# Patient Record
Sex: FEMALE | URBAN - METROPOLITAN AREA
[De-identification: names, ages, dates, MRNs, and addresses within clinical notes are randomized per-mention and may not be internally consistent; named-entity substitution may affect disease eponyms.]

---
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
## 2024-02-06 DIAGNOSIS — E55.9 VITAMIN D DEFICIENCY: ICD-10-CM

## 2024-02-06 RX ORDER — CHOLECALCIFEROL (VITAMIN D3) 125 MCG
CAPSULE ORAL
Qty: 90 TABLET | Refills: 0 | Status: SHIPPED | OUTPATIENT
Start: 2024-02-06

## 2024-03-21 ENCOUNTER — TELEPHONE (OUTPATIENT)
Dept: FAMILY MEDICINE CLINIC | Facility: CLINIC | Age: 19
End: 2024-03-21

## 2024-05-07 DIAGNOSIS — E55.9 VITAMIN D DEFICIENCY: ICD-10-CM

## 2024-05-07 RX ORDER — ERGOCALCIFEROL 1.25 MG/1
50000 CAPSULE ORAL WEEKLY
Qty: 12 CAPSULE | Refills: 0 | OUTPATIENT
Start: 2024-05-07

## 2024-05-07 NOTE — TELEPHONE ENCOUNTER
Called and left message for patient to advise that prescription was denied and patient is to take 200 IU vitamin D a day and have lab done to check vitamin d level.     Order placed.

## 2024-05-09 DIAGNOSIS — E55.9 VITAMIN D DEFICIENCY: ICD-10-CM

## 2024-05-09 RX ORDER — CHOLECALCIFEROL (VITAMIN D3) 125 MCG
CAPSULE ORAL
Qty: 90 TABLET | Refills: 1 | Status: SHIPPED | OUTPATIENT
Start: 2024-05-09

## 2024-05-13 ENCOUNTER — OFFICE VISIT (OUTPATIENT)
Dept: FAMILY MEDICINE CLINIC | Facility: CLINIC | Age: 19
End: 2024-05-13
Payer: COMMERCIAL

## 2024-05-13 VITALS
TEMPERATURE: 97.1 F | HEART RATE: 92 BPM | OXYGEN SATURATION: 100 % | DIASTOLIC BLOOD PRESSURE: 70 MMHG | HEIGHT: 69 IN | WEIGHT: 162 LBS | SYSTOLIC BLOOD PRESSURE: 100 MMHG | BODY MASS INDEX: 23.99 KG/M2

## 2024-05-13 DIAGNOSIS — M25.562 CHRONIC PAIN OF BOTH KNEES: ICD-10-CM

## 2024-05-13 DIAGNOSIS — G89.29 CHRONIC RIGHT-SIDED LOW BACK PAIN WITH RIGHT-SIDED SCIATICA: ICD-10-CM

## 2024-05-13 DIAGNOSIS — M54.41 CHRONIC RIGHT-SIDED LOW BACK PAIN WITH RIGHT-SIDED SCIATICA: ICD-10-CM

## 2024-05-13 DIAGNOSIS — E55.9 VITAMIN D DEFICIENCY: ICD-10-CM

## 2024-05-13 DIAGNOSIS — G89.29 CHRONIC PAIN OF BOTH KNEES: ICD-10-CM

## 2024-05-13 DIAGNOSIS — M25.561 CHRONIC PAIN OF BOTH KNEES: ICD-10-CM

## 2024-05-13 DIAGNOSIS — Z00.01 ENCOUNTER FOR ROUTINE ADULT MEDICAL EXAM WITH ABNORMAL FINDINGS: Primary | ICD-10-CM

## 2024-05-13 PROCEDURE — 99395 PREV VISIT EST AGE 18-39: CPT | Performed by: FAMILY MEDICINE

## 2024-05-13 PROCEDURE — 99214 OFFICE O/P EST MOD 30 MIN: CPT | Performed by: FAMILY MEDICINE

## 2024-05-13 NOTE — PROGRESS NOTES
ADULT ANNUAL PHYSICAL  [facility]    NAME: Mechelle Parker  AGE: 19 y.o. SEX: female  : 2005     DATE: 2024     Assessment and Plan:     Problem List Items Addressed This Visit       Chronic right-sided low back pain with right-sided sciatica     Acute on chronic symptomatic no recent fall or trauma and no red flag symptoms recommend x-ray of the lumbar spine Tylenol for the pain         Relevant Orders    CBC and differential    Basic metabolic panel    Lipid panel    TSH, 3rd generation with Free T4 reflex    DANETTE Screen w/ Reflex to Titer/Pattern    C-reactive protein    Lyme Total AB W Reflex to IGM/IGG    RF Screen w/ Reflex to Titer    Vitamin D 25 hydroxy    Cyclic citrul peptide antibody, IgG    Uric acid    HLA-B27 antigen    XR spine lumbar minimum 4 views non injury    Vitamin D deficiency    Relevant Orders    CBC and differential    Basic metabolic panel    Lipid panel    TSH, 3rd generation with Free T4 reflex    DANETTE Screen w/ Reflex to Titer/Pattern    C-reactive protein    Lyme Total AB W Reflex to IGM/IGG    RF Screen w/ Reflex to Titer    Vitamin D 25 hydroxy    Cyclic citrul peptide antibody, IgG    Uric acid    HLA-B27 antigen    Encounter for routine adult medical exam with abnormal findings - Primary     Advice and education were given regarding nutrition, aerobic exercises, weight-bearing exercises, cardiovascular risk reduction, fall risk reduction, and age-appropriate supplements.     The patient was counseled regarding instructions for management, risk factor reductions, prognosis, risks and benefits of treatment options, patient and family education, and importance of compliance with treatment.         Chronic pain of both knees     New diagnosis symptomatic chronic more than 3 months no fall or trauma recommend Tylenol for the pain plan for x-ray of bilateral knee   Patient has a multiple joint pain  recommend to do workup blood work discussed with the patient and she agree         Relevant Orders    CBC and differential    Basic metabolic panel    Lipid panel    TSH, 3rd generation with Free T4 reflex    DANETTE Screen w/ Reflex to Titer/Pattern    C-reactive protein    Lyme Total AB W Reflex to IGM/IGG    RF Screen w/ Reflex to Titer    Vitamin D 25 hydroxy    Cyclic citrul peptide antibody, IgG    Uric acid    HLA-B27 antigen    XR knee 3 vw left non injury    XR knee 3 vw right non injury       Immunizations and preventive care screenings were discussed with patient today. Appropriate education was printed on patient's after visit summary.    Counseling:  Alcohol/drug use: discussed moderation in alcohol intake, the recommendations for healthy alcohol use, and avoidance of illicit drug use.  Dental Health: discussed importance of regular tooth brushing, flossing, and dental visits.  Injury prevention: discussed safety/seat belts, safety helmets, smoke detectors, carbon dioxide detectors, and smoking near bedding or upholstery.  Sexual health: discussed sexually transmitted diseases, partner selection, use of condoms, avoidance of unintended pregnancy, and contraceptive alternatives.  Exercise: the importance of regular exercise/physical activity was discussed. Recommend exercise 3-5 times per week for at least 30 minutes.          Return in about 1 year (around 5/13/2025).     Chief Complaint:     Chief Complaint   Patient presents with   • Physical Exam   • Knee Pain     R- with thigh issue      History of Present Illness:     Adult Annual Physical   Patient here for a comprehensive physical exam. The patient reports problems - patient also concern about B/ L knee pain achy no fall or trauma no swelling no limit range of motion ,also concern about low back pain  .  Localized in the lumbar spine radiated to the lower extremity no numbness no muscle weakness no lose control of the urine or stool no history of  trauma patient has been going to the gym and she been doing exercise and have a lifting past medical surgical family and social history discussed with the patient  Diet and Physical Activity  Diet/Nutrition:  no special diet .   Exercise:  daily at gym .      Depression Screening  PHQ-2/9 Depression Screening    Little interest or pleasure in doing things: 1 - several days  Feeling down, depressed, or hopeless: 1 - several days  PHQ-2 Score: 2  PHQ-2 Interpretation: Negative depression screen       General Health  Sleep: sleeps poorly.   Hearing: normal - bilateral.  Vision: wears glasses.   Dental: regular dental visits.       /GYN Health  Follows with gynecology? no   Last menstrual period: 05/06/24  Contraceptive method:  none .  History of STDs?: no.     Advanced Care Planning  Do you have an advanced directive? no  Do you have a durable medical power of ? no  ACP document given to the patient? no      Review of Systems:     Review of Systems   Constitutional:  Negative for chills and fever.   HENT:  Negative for ear pain and sore throat.    Eyes:  Negative for pain and visual disturbance.   Respiratory:  Negative for cough and shortness of breath.    Cardiovascular:  Negative for chest pain and palpitations.   Gastrointestinal:  Negative for abdominal pain, constipation, diarrhea, nausea and vomiting.   Genitourinary:  Negative for dysuria and hematuria.   Musculoskeletal:  Positive for arthralgias and back pain.   Skin:  Negative for color change and rash.   Neurological:  Negative for seizures and syncope.   Hematological:  Does not bruise/bleed easily.   Psychiatric/Behavioral:  Negative for behavioral problems.    All other systems reviewed and are negative.     Past Medical History:     Past Medical History:   Diagnosis Date   • Acute bacterial conjunctivitis of right eye 12/04/2020   • Dysuria 08/11/2020   • Folliculitis 06/19/2019   • Pain and swelling of toe of right foot 02/08/2021   •  Syncope 06/14/2022   • Viral upper respiratory tract infection 06/19/2019   • Vision blurred 06/14/2022   • Well adolescent visit with abnormal findings 06/19/2019      Past Surgical History:     History reviewed. No pertinent surgical history.   Social History:     Social History     Socioeconomic History   • Marital status: Single     Spouse name: None   • Number of children: None   • Years of education: None   • Highest education level: None   Occupational History   • None   Tobacco Use   • Smoking status: Never     Passive exposure: Never   • Smokeless tobacco: Never   Vaping Use   • Vaping status: Never Used   Substance and Sexual Activity   • Alcohol use: Never   • Drug use: Never   • Sexual activity: Never   Other Topics Concern   • None   Social History Narrative   • None     Social Determinants of Health     Financial Resource Strain: Low Risk  (3/2/2021)    Overall Financial Resource Strain (CARDIA)    • Difficulty of Paying Living Expenses: Not hard at all   Food Insecurity: No Food Insecurity (3/2/2021)    Hunger Vital Sign    • Worried About Running Out of Food in the Last Year: Never true    • Ran Out of Food in the Last Year: Never true   Transportation Needs: No Transportation Needs (3/2/2021)    PRAPARE - Transportation    • Lack of Transportation (Medical): No    • Lack of Transportation (Non-Medical): No   Physical Activity: Insufficiently Active (3/2/2021)    Exercise Vital Sign    • Days of Exercise per Week: 2 days    • Minutes of Exercise per Session: 30 min   Stress: No Stress Concern Present (3/2/2021)    [place] citizen [place] of Occupational Health - Occupational Stress Questionnaire    • Feeling of Stress : Not at all   Social Connections: Socially Isolated (3/2/2021)    Social Connection and Isolation Panel [NHANES]    • Frequency of Communication with Friends and Family: More than three times a week    • Frequency of Social Gatherings with Friends and Family: More than three times a week     "• Attends Zoroastrianism Services: Never    • Active Member of Clubs or Organizations: No    • Attends Club or Organization Meetings: Never    • Marital Status: Never    Intimate Partner Violence: Not At Risk (3/2/2021)    Humiliation, Afraid, Rape, and Kick questionnaire    • Fear of Current or Ex-Partner: No    • Emotionally Abused: No    • Physically Abused: No    • Sexually Abused: No   Housing Stability: Not on file      Family History:     History reviewed. No pertinent family history.   Current Medications:     Current Outpatient Medications   Medication Sig Dispense Refill   • Cholecalciferol (Vitamin D3) 50 MCG (2000 UT) TABS TAKE ONE TABLET BY MOUTH EVERY DAY 90 tablet 1   • clindamycin (CLINDAGEL) 1 % gel Apply topically after washing face. Use in the morning once daily. 30 g 0   • tretinoin (RETIN-A) 0.1 % cream Apply topically daily at bedtime Spread one pea-sized amount of medication over entire face about one hour before bedtime every other night and then increase to nightly as tolerates 45 g 2     No current facility-administered medications for this visit.      Allergies:     No Known Allergies   Physical Exam:     /70 (BP Location: Left arm, Patient Position: Sitting)   Pulse 92   Temp (!) 97.1 °F (36.2 °C) (Tympanic)   Ht 5' 9\" (1.753 m)   Wt 73.5 kg (162 lb)   LMP 05/06/2024 (Exact Date)   SpO2 100%   Breastfeeding No   BMI 23.92 kg/m²     Physical Exam  Vitals and nursing note reviewed.   Constitutional:       General: She is not in acute distress.     Appearance: She is well-developed.   HENT:      Head: Normocephalic and atraumatic.      Right Ear: Tympanic membrane normal. There is no impacted cerumen.      Left Ear: Tympanic membrane normal. There is no impacted cerumen.      Nose: No congestion or rhinorrhea.      Mouth/Throat:      Pharynx: No oropharyngeal exudate or posterior oropharyngeal erythema.   Eyes:      General:         Right eye: No discharge.         Left eye: " No discharge.      Conjunctiva/sclera: Conjunctivae normal.   Cardiovascular:      Rate and Rhythm: Normal rate and regular rhythm.      Heart sounds: No murmur heard.  Pulmonary:      Effort: Pulmonary effort is normal. No respiratory distress.      Breath sounds: Normal breath sounds.   Abdominal:      Palpations: Abdomen is soft.      Tenderness: There is no abdominal tenderness.   Musculoskeletal:         General: No swelling.      Cervical back: Neck supple.      Lumbar back: Tenderness present. No bony tenderness. Normal range of motion. Positive right straight leg raise test.      Right knee: No swelling, deformity or bony tenderness. Normal range of motion. No tenderness.      Left knee: No swelling, effusion or bony tenderness. Normal range of motion. No tenderness.   Skin:     General: Skin is warm and dry.      Capillary Refill: Capillary refill takes less than 2 seconds.      Findings: No rash.   Neurological:      Mental Status: She is alert and oriented to person, place, and time.   Psychiatric:         Mood and Affect: Mood normal.   Evaluated patient and    Leilani Chauhan MD   [place] [facility]

## 2024-05-16 PROBLEM — M25.561 CHRONIC PAIN OF BOTH KNEES: Status: ACTIVE | Noted: 2024-05-16

## 2024-05-16 PROBLEM — M25.562 CHRONIC PAIN OF BOTH KNEES: Status: ACTIVE | Noted: 2024-05-16

## 2024-05-16 PROBLEM — H53.8 VISION BLURRED: Status: RESOLVED | Noted: 2022-06-14 | Resolved: 2024-05-16

## 2024-05-16 PROBLEM — G89.29 CHRONIC PAIN OF BOTH KNEES: Status: ACTIVE | Noted: 2024-05-16

## 2024-05-16 PROBLEM — R55 SYNCOPE: Status: RESOLVED | Noted: 2022-06-14 | Resolved: 2024-05-16

## 2024-05-16 PROBLEM — M54.41 CHRONIC RIGHT-SIDED LOW BACK PAIN WITH RIGHT-SIDED SCIATICA: Status: ACTIVE | Noted: 2020-01-29

## 2024-05-16 NOTE — ASSESSMENT & PLAN NOTE
Acute on chronic symptomatic no recent fall or trauma and no red flag symptoms recommend x-ray of the lumbar spine Tylenol for the pain

## 2024-05-16 NOTE — ASSESSMENT & PLAN NOTE
New diagnosis symptomatic chronic more than 3 months no fall or trauma recommend Tylenol for the pain plan for x-ray of bilateral knee   Patient has a multiple joint pain recommend to do workup blood work discussed with the patient and she agree

## 2024-05-23 DIAGNOSIS — L70.0 ACNE VULGARIS: ICD-10-CM

## 2024-05-23 NOTE — TELEPHONE ENCOUNTER
Refill must be reviewed and completed by the office or provider. The refill is unable to be approved or denied by the medication management team.     Off protocol

## 2024-05-26 RX ORDER — CLINDAMYCIN PHOSPHATE 10 MG/G
GEL TOPICAL
Qty: 30 G | Refills: 0 | Status: SHIPPED | OUTPATIENT
Start: 2024-05-26

## 2024-06-10 ENCOUNTER — APPOINTMENT (OUTPATIENT)
Dept: LAB | Facility: MEDICAL CENTER | Age: 19
End: 2024-06-10
Payer: COMMERCIAL

## 2024-06-10 ENCOUNTER — APPOINTMENT (OUTPATIENT)
Dept: RADIOLOGY | Facility: MEDICAL CENTER | Age: 19
End: 2024-06-10
Payer: COMMERCIAL

## 2024-06-10 DIAGNOSIS — G89.29 CHRONIC PAIN OF BOTH KNEES: ICD-10-CM

## 2024-06-10 DIAGNOSIS — M54.41 CHRONIC RIGHT-SIDED LOW BACK PAIN WITH RIGHT-SIDED SCIATICA: ICD-10-CM

## 2024-06-10 DIAGNOSIS — M25.562 CHRONIC PAIN OF BOTH KNEES: ICD-10-CM

## 2024-06-10 DIAGNOSIS — E55.9 VITAMIN D DEFICIENCY: ICD-10-CM

## 2024-06-10 DIAGNOSIS — G89.29 CHRONIC RIGHT-SIDED LOW BACK PAIN WITH RIGHT-SIDED SCIATICA: ICD-10-CM

## 2024-06-10 DIAGNOSIS — M25.561 CHRONIC PAIN OF BOTH KNEES: ICD-10-CM

## 2024-06-10 LAB
25(OH)D3 SERPL-MCNC: 28 NG/ML (ref 30–100)
ANION GAP SERPL CALCULATED.3IONS-SCNC: 7 MMOL/L (ref 4–13)
BASOPHILS # BLD AUTO: 0.04 THOUSANDS/ÂΜL (ref 0–0.1)
BASOPHILS NFR BLD AUTO: 1 % (ref 0–1)
BUN SERPL-MCNC: 10 MG/DL (ref 5–25)
CALCIUM SERPL-MCNC: 9.4 MG/DL (ref 8.4–10.2)
CHLORIDE SERPL-SCNC: 105 MMOL/L (ref 96–108)
CHOLEST SERPL-MCNC: 147 MG/DL
CO2 SERPL-SCNC: 26 MMOL/L (ref 21–32)
CREAT SERPL-MCNC: 0.79 MG/DL (ref 0.6–1.3)
CRP SERPL QL: 1.4 MG/L
EOSINOPHIL # BLD AUTO: 0.07 THOUSAND/ÂΜL (ref 0–0.61)
EOSINOPHIL NFR BLD AUTO: 3 % (ref 0–6)
ERYTHROCYTE [DISTWIDTH] IN BLOOD BY AUTOMATED COUNT: 12.6 % (ref 11.6–15.1)
GFR SERPL CREATININE-BSD FRML MDRD: 108 ML/MIN/1.73SQ M
GLUCOSE SERPL-MCNC: 90 MG/DL (ref 65–140)
HCT VFR BLD AUTO: 42.1 % (ref 34.8–46.1)
HDLC SERPL-MCNC: 72 MG/DL
HGB BLD-MCNC: 13.6 G/DL (ref 11.5–15.4)
IMM GRANULOCYTES # BLD AUTO: 0.01 THOUSAND/UL (ref 0–0.2)
IMM GRANULOCYTES NFR BLD AUTO: 0 % (ref 0–2)
LDLC SERPL CALC-MCNC: 64 MG/DL (ref 0–100)
LYMPHOCYTES # BLD AUTO: 1.44 THOUSANDS/ÂΜL (ref 0.6–4.47)
LYMPHOCYTES NFR BLD AUTO: 52 % (ref 14–44)
MCH RBC QN AUTO: 28.9 PG (ref 26.8–34.3)
MCHC RBC AUTO-ENTMCNC: 32.3 G/DL (ref 31.4–37.4)
MCV RBC AUTO: 89 FL (ref 82–98)
MONOCYTES # BLD AUTO: 0.17 THOUSAND/ÂΜL (ref 0.17–1.22)
MONOCYTES NFR BLD AUTO: 6 % (ref 4–12)
NEUTROPHILS # BLD AUTO: 1.05 THOUSANDS/ÂΜL (ref 1.85–7.62)
NEUTS SEG NFR BLD AUTO: 38 % (ref 43–75)
NONHDLC SERPL-MCNC: 75 MG/DL
NRBC BLD AUTO-RTO: 0 /100 WBCS
PLATELET # BLD AUTO: 275 THOUSANDS/UL (ref 149–390)
PMV BLD AUTO: 10.9 FL (ref 8.9–12.7)
POTASSIUM SERPL-SCNC: 4.2 MMOL/L (ref 3.5–5.3)
RBC # BLD AUTO: 4.71 MILLION/UL (ref 3.81–5.12)
SODIUM SERPL-SCNC: 138 MMOL/L (ref 135–147)
TRIGL SERPL-MCNC: 53 MG/DL
TSH SERPL DL<=0.05 MIU/L-ACNC: 1.09 UIU/ML (ref 0.45–4.5)
URATE SERPL-MCNC: 4.2 MG/DL (ref 2–7.5)
WBC # BLD AUTO: 2.78 THOUSAND/UL (ref 4.31–10.16)

## 2024-06-10 PROCEDURE — 80061 LIPID PANEL: CPT

## 2024-06-10 PROCEDURE — 84550 ASSAY OF BLOOD/URIC ACID: CPT

## 2024-06-10 PROCEDURE — 82306 VITAMIN D 25 HYDROXY: CPT

## 2024-06-10 PROCEDURE — 84443 ASSAY THYROID STIM HORMONE: CPT

## 2024-06-10 PROCEDURE — 86038 ANTINUCLEAR ANTIBODIES: CPT

## 2024-06-10 PROCEDURE — 73562 X-RAY EXAM OF KNEE 3: CPT

## 2024-06-10 PROCEDURE — 86430 RHEUMATOID FACTOR TEST QUAL: CPT

## 2024-06-10 PROCEDURE — 72110 X-RAY EXAM L-2 SPINE 4/>VWS: CPT

## 2024-06-10 PROCEDURE — 86140 C-REACTIVE PROTEIN: CPT

## 2024-06-10 PROCEDURE — 85025 COMPLETE CBC W/AUTO DIFF WBC: CPT

## 2024-06-10 PROCEDURE — 86618 LYME DISEASE ANTIBODY: CPT

## 2024-06-10 PROCEDURE — 86200 CCP ANTIBODY: CPT

## 2024-06-10 PROCEDURE — 36415 COLL VENOUS BLD VENIPUNCTURE: CPT

## 2024-06-10 PROCEDURE — 80048 BASIC METABOLIC PNL TOTAL CA: CPT

## 2024-06-11 ENCOUNTER — TELEPHONE (OUTPATIENT)
Dept: FAMILY MEDICINE CLINIC | Facility: CLINIC | Age: 19
End: 2024-06-11

## 2024-06-11 LAB
ANA SER QL IA: NEGATIVE
B BURGDOR IGG+IGM SER QL IA: NEGATIVE
CCP AB SER IA-ACNC: 1.2
RHEUMATOID FACT SER QL LA: NEGATIVE

## 2024-06-11 NOTE — TELEPHONE ENCOUNTER
Arash Bailey MD  P  [facility]  X-rays of knee and lumbar spine negative, patient can follow-up with Dr. Chauhan if pain persists or make office visit sooner if needed.

## 2024-06-11 NOTE — TELEPHONE ENCOUNTER
----- Message from Arash Bailey MD sent at 6/11/2024 10:47 AM EDT -----  X-ray of knee normal, patient can follow-up with Dr. Chauhan if she continues to have pain.

## 2024-06-12 ENCOUNTER — TELEPHONE (OUTPATIENT)
Dept: FAMILY MEDICINE CLINIC | Facility: CLINIC | Age: 19
End: 2024-06-12

## 2024-07-19 ENCOUNTER — OFFICE VISIT (OUTPATIENT)
Dept: FAMILY MEDICINE CLINIC | Facility: CLINIC | Age: 19
End: 2024-07-19
Payer: COMMERCIAL

## 2024-07-19 VITALS
DIASTOLIC BLOOD PRESSURE: 60 MMHG | OXYGEN SATURATION: 99 % | HEIGHT: 69 IN | TEMPERATURE: 98.1 F | WEIGHT: 160 LBS | BODY MASS INDEX: 23.7 KG/M2 | SYSTOLIC BLOOD PRESSURE: 90 MMHG | HEART RATE: 93 BPM

## 2024-07-19 DIAGNOSIS — D70.9 NEUTROPENIA, UNSPECIFIED TYPE (HCC): Primary | ICD-10-CM

## 2024-07-19 DIAGNOSIS — E55.9 VITAMIN D DEFICIENCY: ICD-10-CM

## 2024-07-19 DIAGNOSIS — Z23 NEED FOR MENINGOCOCCAL VACCINATION: ICD-10-CM

## 2024-07-19 DIAGNOSIS — L70.0 ACNE VULGARIS: ICD-10-CM

## 2024-07-19 DIAGNOSIS — Z00.01 ENCOUNTER FOR ROUTINE ADULT MEDICAL EXAM WITH ABNORMAL FINDINGS: ICD-10-CM

## 2024-07-19 DIAGNOSIS — Z23 ENCOUNTER FOR IMMUNIZATION: ICD-10-CM

## 2024-07-19 PROCEDURE — 90621 MENB-FHBP VACC 2/3 DOSE IM: CPT | Performed by: FAMILY MEDICINE

## 2024-07-19 PROCEDURE — 99214 OFFICE O/P EST MOD 30 MIN: CPT | Performed by: FAMILY MEDICINE

## 2024-07-19 PROCEDURE — 99395 PREV VISIT EST AGE 18-39: CPT | Performed by: FAMILY MEDICINE

## 2024-07-19 PROCEDURE — 90471 IMMUNIZATION ADMIN: CPT | Performed by: FAMILY MEDICINE

## 2024-07-19 NOTE — PROGRESS NOTES
Adult Annual Physical  Name: Mechelle Parker      : 2005      MRN: [id number]  Encounter Provider: Leilani Chauhan MD  Encounter Date: 2024   Encounter department: [facility]    Assessment & Plan   1. Neutropenia, unspecified type (HCC)  Assessment & Plan:  Chronic asymptomatic no fever no weight change patient to follow-up with hematology  Orders:  -     Ambulatory Referral to Hematology / Oncology; Future  2. Encounter for routine adult medical exam with abnormal findings  Assessment & Plan:  Advice and education were given regarding nutrition, aerobic exercises, weight-bearing exercises, cardiovascular risk reduction, fall risk reduction, and age-appropriate supplements.     The patient was counseled regarding instructions for management, risk factor reductions, prognosis, risks and benefits of treatment options, patient and family education, and importance of compliance with treatment.  3. Vitamin D deficiency  Assessment & Plan:  Chronic asymptomatic continue vitamin D supplement vitamin D rich diet discussed with patient recent blood work vitamin D level 28 discussed important compliant with the medication  4. Encounter for immunization  -     MENINGOCOCCAL B RECOMBINANT  5. Need for meningococcal vaccination  -     MENINGOCOCCAL B RECOMBINANT  6. Acne vulgaris  Assessment & Plan:  Chronic fair controlled follow-up with the dermatology probiotic reevaluate current medications including clindamycin and Retin-A    Immunizations and preventive care screenings were discussed with patient today. Appropriate education was printed on patient's after visit summary.    Counseling:  Alcohol/drug use: discussed moderation in alcohol intake, the recommendations for healthy alcohol use, and avoidance of illicit drug use.  Dental Health: discussed importance of regular tooth brushing, flossing, and dental visits.  Injury prevention: discussed safety/seat belts, safety helmets,  smoke detectors, carbon dioxide detectors, and smoking near bedding or upholstery.  Sexual health: discussed sexually transmitted diseases, partner selection, use of condoms, avoidance of unintended pregnancy, and contraceptive alternatives.  Exercise: the importance of regular exercise/physical activity was discussed. Recommend exercise 3-5 times per week for at least 30 minutes.       Depression Screening and Follow-up Plan: Patient was screened for depression during today's encounter. They screened negative with a PHQ-2 score of 2.        History of Present Illness   Patient here for well exam follow-up with a chronic condition that she had also follow-up for [facility] review immunization and discussed with patient her recent blood work reviewed medical surgical family and social history    Adult Annual Physical:  Patient presents for annual physical.     Diet and Physical Activity:  - Diet/Nutrition:. no special diet  - Exercise: 5-7 times a week on average.    Depression Screening:  - PHQ-2 Score: 2    General Health:  - Sleep: 4-6 hours of sleep on average.  - Hearing: normal hearing bilateral ears.  - Vision: no vision problems.  - Dental: regular dental visits.    /GYN Health:  - Follows with GYN: no.   - Menopause: premenopausal.   - History of STDs: no    Review of Systems   Constitutional:  Negative for chills and fever.   HENT:  Negative for ear pain and sore throat.    Eyes:  Negative for pain and visual disturbance.   Respiratory:  Negative for cough and shortness of breath.    Cardiovascular:  Negative for chest pain and palpitations.   Gastrointestinal:  Negative for abdominal pain and vomiting.   Genitourinary:  Negative for dysuria and hematuria.   Musculoskeletal:  Negative for arthralgias and back pain.   Skin:  Negative for color change and rash.   Neurological:  Negative for seizures and syncope.   All other systems reviewed and are negative.        Objective     BP 90/60 (BP Location: Left  "arm, Patient Position: Sitting)   Pulse 93   Temp 98.1 °F (36.7 °C) (Tympanic)   Ht 5' 9\" (1.753 m)   Wt 72.6 kg (160 lb)   LMP 07/03/2024 (Exact Date)   SpO2 99%   Breastfeeding No   BMI 23.63 kg/m²     Physical Exam  Vitals and nursing note reviewed.   Constitutional:       General: She is not in acute distress.     Appearance: She is well-developed. She is not diaphoretic.   HENT:      Head: Normocephalic.      Right Ear: Tympanic membrane and external ear normal.      Left Ear: Tympanic membrane and external ear normal.      Nose: No rhinorrhea.      Mouth/Throat:      Pharynx: No posterior oropharyngeal erythema.   Eyes:      General:         Right eye: No discharge.         Left eye: No discharge.      Conjunctiva/sclera: Conjunctivae normal.   Neck:      Vascular: No JVD.   Cardiovascular:      Rate and Rhythm: Normal rate and regular rhythm.      Heart sounds: Normal heart sounds. No murmur heard.     No gallop.   Pulmonary:      Effort: Pulmonary effort is normal. No respiratory distress.      Breath sounds: Normal breath sounds. No stridor. No wheezing or rales.   Chest:      Chest wall: No tenderness.   Abdominal:      General: There is no distension.      Palpations: Abdomen is soft. There is no mass.      Tenderness: There is no abdominal tenderness. There is no rebound.   Musculoskeletal:         General: No tenderness.      Cervical back: Normal range of motion and neck supple.   Lymphadenopathy:      Cervical: No cervical adenopathy.   Skin:     General: Skin is warm.      Findings: No erythema or rash.   Neurological:      Mental Status: She is alert and oriented to person, place, and time.         "

## 2024-07-21 NOTE — ASSESSMENT & PLAN NOTE
Chronic asymptomatic continue vitamin D supplement vitamin D rich diet discussed with patient recent blood work vitamin D level 28 discussed important compliant with the medication

## 2024-07-21 NOTE — ASSESSMENT & PLAN NOTE
Chronic fair controlled follow-up with the dermatology probiotic reevaluate current medications including clindamycin and Retin-A

## 2024-09-28 DIAGNOSIS — L70.0 ACNE VULGARIS: ICD-10-CM

## 2024-10-02 RX ORDER — CLINDAMYCIN PHOSPHATE 10 MG/G
GEL TOPICAL
Qty: 30 G | Refills: 0 | Status: SHIPPED | OUTPATIENT
Start: 2024-10-02

## 2024-10-04 ENCOUNTER — TELEPHONE (OUTPATIENT)
Dept: HEMATOLOGY ONCOLOGY | Facility: CLINIC | Age: 19
End: 2024-10-04

## 2024-10-04 NOTE — TELEPHONE ENCOUNTER
Called patient in regards to missed appointment. Patient was unavailable so I left a voicemail. If you would like to reschedule your appointment please call our office back at [phone].       Thank you

## 2024-10-29 ENCOUNTER — TELEPHONE (OUTPATIENT)
Dept: HEMATOLOGY ONCOLOGY | Facility: CLINIC | Age: 19
End: 2024-10-29

## 2024-10-29 NOTE — TELEPHONE ENCOUNTER
Spoke w/ pt. Pt requesting to see Dr. Salazar only. Pt scheduled for 1/10/25 @ 1020 am over at the [place] office. Pt verbally confirmed the appt.

## 2024-11-02 DIAGNOSIS — E55.9 VITAMIN D DEFICIENCY: ICD-10-CM

## 2024-11-04 RX ORDER — CHOLECALCIFEROL (VITAMIN D3) 50 MCG
TABLET ORAL
Qty: 90 TABLET | Refills: 1 | Status: SHIPPED | OUTPATIENT
Start: 2024-11-04

## 2024-12-30 ENCOUNTER — OFFICE VISIT (OUTPATIENT)
Dept: FAMILY MEDICINE CLINIC | Facility: CLINIC | Age: 19
End: 2024-12-30
Payer: COMMERCIAL

## 2024-12-30 VITALS
TEMPERATURE: 97.5 F | HEIGHT: 69 IN | OXYGEN SATURATION: 99 % | BODY MASS INDEX: 23.7 KG/M2 | DIASTOLIC BLOOD PRESSURE: 70 MMHG | SYSTOLIC BLOOD PRESSURE: 100 MMHG | WEIGHT: 160 LBS | HEART RATE: 71 BPM

## 2024-12-30 DIAGNOSIS — L42 PITYRIASIS ROSEA: Primary | ICD-10-CM

## 2024-12-30 DIAGNOSIS — L91.0 KELOID OF SKIN: ICD-10-CM

## 2024-12-30 PROCEDURE — 99214 OFFICE O/P EST MOD 30 MIN: CPT | Performed by: FAMILY MEDICINE

## 2024-12-30 RX ORDER — LORATADINE 10 MG/1
10 TABLET ORAL DAILY
Qty: 30 TABLET | Refills: 0 | Status: SHIPPED | OUTPATIENT
Start: 2024-12-30

## 2024-12-30 NOTE — PATIENT INSTRUCTIONS
"Patient Education     Pityriasis rosea   The Basics   Written by the doctors and editors at [facility]   What is pityriasis rosea? -- Pityriasis rosea is a skin rash that causes small, itchy spots on the belly, back, chest, arms, and legs. The rash usually lasts about 4 to 6 weeks, but in some people, it can last for months.  Pityriasis rosea is most common in older children and young adults.  What causes pityriasis rosea? -- The cause is not known. But it does not seem to be easily spread from person to person.  What are the symptoms of pityriasis rosea? -- In many people, the rash starts with 1 round or oval patch. This spot is usually around 1 to 2 inches (2.5 to 5 cm) wide, but might be larger. A day or 2 later, many smaller spots appear. But not everyone gets the large spot before the rest of the rash.  The color of the spots can be different in different people. They might be red-brown, pink, dark brown, or dark gray (picture 1 and picture 2).  The spots might be:   On the belly, back, chest, arms, and legs   Spread out in a \"fir tree\" or \"Maksim tree\" pattern on the back   Itchy   A little scaly  In children, the spots sometimes happen on the face and scalp.  Is there a test for pityriasis rosea? -- Maybe. Your doctor or nurse can often tell if you have it by learning about your symptoms and doing an exam. But they might gently scrape the rash or do a different test to get a sample of your skin. Tests on the skin sample can help the doctor tell if you have pityriasis rosea or a different problem.  Is there anything I can do on my own to feel better? -- Yes. You can:   Take a special kind of bath called an oatmeal bath. Use water that is lukewarm, not hot.   Use unscented moisturizing lotion or cream on your skin.   Try to keep your body cool.  How is pityriasis rosea treated? -- Most people do not need any treatment. If your symptoms bother you, your doctor might prescribe creams or ointments to help with " "itching. In rare cases, doctors prescribe other medicines or a special type of treatment that uses lights, called \"phototherapy.\"  All topics are updated as new evidence becomes available and our peer review process is complete.  This topic retrieved from [employer] on: Mar 21, 2024.  Topic 51193 Version 9.0  Release: 32.2.4 - C32.79  © 2024 UpToDate, Inc. and/or its affiliates. All rights reserved.  picture 1: Pityriasis rosea     Pityriasis rosea can cause spots on the skin. The rash sometimes starts with 1 larger patch. It might also be itchy. The spots can be different colors for different people. This person has many pink spots on their back.  Graphic 537446 Version 5.0  picture 2: Pityriasis rosea     Pityriasis rosea can cause spots on the skin. The rash sometimes starts with 1 larger patch. It might also be itchy. The spots can be different colors for different people. This person has many red-brown spots on their back.  Graphic 147572 Version 5.0  Consumer Information Use and Disclaimer   Disclaimer: This generalized information is a limited summary of diagnosis, treatment, and/or medication information. It is not meant to be comprehensive and should be used as a tool to help the user understand and/or assess potential diagnostic and treatment options. It does NOT include all information about conditions, treatments, medications, side effects, or risks that may apply to a specific patient. It is not intended to be medical advice or a substitute for the medical advice, diagnosis, or treatment of a health care provider based on the health care provider's examination and assessment of a patient's specific and unique circumstances. Patients must speak with a health care provider for complete information about their health, medical questions, and treatment options, including any risks or benefits regarding use of medications. This information does not endorse any treatments or medications as safe, effective, or " approved for treating a specific patient. UpToDate, Inc. and its affiliates disclaim any warranty or liability relating to this information or the use thereof.The use of this information is governed by the Terms of Use, available at https://www.wolMindShare Networksuwer.com/en/know/clinical-effectiveness-terms. 2024© [employer] Inc. and its affiliates and/or licensors. All rights reserved.  Copyright   © 2024 [employer] Inc[employer] and/or its affiliates. All rights reserved.

## 2025-01-02 PROBLEM — L42 PITYRIASIS ROSEA: Status: ACTIVE | Noted: 2025-01-02

## 2025-01-02 PROBLEM — L91.0 KELOID OF SKIN: Status: ACTIVE | Noted: 2025-01-02

## 2025-01-02 NOTE — PROGRESS NOTES
Name: Mechelle Parker      : 2005      MRN: [id number]  Encounter Provider: Leilani Chauhan MD  Encounter Date: 2024   Encounter department: [facility]      Assessment & Plan  Pityriasis rosea  New diagnosis symptomatic discussed with the patient natural of the problem recommend to use antihistamine for the itchy keep well-hydrated it is a self-limited condition handout has been given to the patient   Orders:  •  loratadine (CLARITIN) 10 mg tablet; Take 1 tablet (10 mg total) by mouth daily    Keloid of skin  New diagnosis keloid behind the right ear at the site of the piercing discussed the natural of the problem with the patient and if interested to see dermatology she will hold on that for now            History of Present Illness     Patient today has multiple concerns she is concerned about a rash started with 1 lesion on her abdomen and 2 days later started having similar rash spreading on her chest back and upper extremity itchy denies any change in the shampoo lotion no new medication no new food or spicy also patient concerned about lump behind her right ear she noticed it after she did the piercing in couple weeks is not painful no change in the size       Review of Systems   Constitutional:  Negative for chills and fever.   HENT:  Negative for ear pain and sore throat.    Eyes:  Negative for pain and visual disturbance.   Respiratory:  Negative for cough and shortness of breath.    Cardiovascular:  Negative for chest pain and palpitations.   Gastrointestinal:  Negative for abdominal pain, constipation, diarrhea and vomiting.   Genitourinary:  Negative for dysuria and hematuria.   Musculoskeletal:  Negative for gait problem.   Skin:  Positive for rash. Negative for color change.   Neurological:  Negative for seizures and syncope.   All other systems reviewed and are negative.    Objective     /70 (BP Location: Left arm, Patient Position: Sitting)    "Pulse 71   Temp 97.5 °F (36.4 °C) (Tympanic)   Ht 5' 9\" (1.753 m)   Wt 72.6 kg (160 lb)   SpO2 99%   BMI 23.63 kg/m²     Physical Exam  Vitals and nursing note reviewed.   Constitutional:       General: She is not in acute distress.     Appearance: She is well-developed. She is not diaphoretic.   HENT:      Head: Normocephalic.      Right Ear: Tympanic membrane and external ear normal.      Left Ear: Tympanic membrane and external ear normal.      Ears:        Nose: No rhinorrhea.      Mouth/Throat:      Pharynx: No posterior oropharyngeal erythema.   Eyes:      General:         Right eye: No discharge.         Left eye: No discharge.      Conjunctiva/sclera: Conjunctivae normal.   Neck:      Vascular: No JVD.   Cardiovascular:      Rate and Rhythm: Normal rate and regular rhythm.      Heart sounds: Normal heart sounds. No murmur heard.     No gallop.   Pulmonary:      Effort: Pulmonary effort is normal. No respiratory distress.      Breath sounds: Normal breath sounds. No wheezing.   Abdominal:      General: There is no distension.      Palpations: Abdomen is soft.      Tenderness: There is no abdominal tenderness. There is no rebound.   Musculoskeletal:         General: No tenderness.      Cervical back: Normal range of motion and neck supple.   Lymphadenopathy:      Cervical: No cervical adenopathy.   Skin:     General: Skin is warm.      Findings: Erythema and rash present.          Neurological:      Mental Status: She is alert and oriented to person, place, and time.         Leilani Chauhan MD     "

## 2025-01-02 NOTE — ASSESSMENT & PLAN NOTE
New diagnosis keloid behind the right ear at the site of the piercing discussed the natural of the problem with the patient and if interested to see dermatology she will hold on that for now

## 2025-01-02 NOTE — ASSESSMENT & PLAN NOTE
New diagnosis symptomatic discussed with the patient natural of the problem recommend to use antihistamine for the itchy keep well-hydrated it is a self-limited condition handout has been given to the patient   Orders:  •  loratadine (CLARITIN) 10 mg tablet; Take 1 tablet (10 mg total) by mouth daily

## 2025-01-09 PROBLEM — R79.0 LOW FERRITIN LEVEL: Status: ACTIVE | Noted: 2025-01-09

## 2025-01-09 PROBLEM — D70.9 CHRONIC NEUTROPENIA (HCC): Status: ACTIVE | Noted: 2021-03-02

## 2025-01-09 PROBLEM — D70.0: Status: ACTIVE | Noted: 2021-03-02

## 2025-01-10 ENCOUNTER — OFFICE VISIT (OUTPATIENT)
Dept: HEMATOLOGY ONCOLOGY | Facility: CLINIC | Age: 20
End: 2025-01-10
Payer: COMMERCIAL

## 2025-01-10 ENCOUNTER — APPOINTMENT (OUTPATIENT)
Dept: LAB | Facility: MEDICAL CENTER | Age: 20
End: 2025-01-10
Payer: COMMERCIAL

## 2025-01-10 VITALS
OXYGEN SATURATION: 99 % | RESPIRATION RATE: 20 BRPM | WEIGHT: 163 LBS | HEART RATE: 84 BPM | SYSTOLIC BLOOD PRESSURE: 100 MMHG | BODY MASS INDEX: 24.71 KG/M2 | TEMPERATURE: 97.8 F | DIASTOLIC BLOOD PRESSURE: 68 MMHG | HEIGHT: 68 IN

## 2025-01-10 DIAGNOSIS — D72.819 CHRONIC LEUKOPENIA: ICD-10-CM

## 2025-01-10 DIAGNOSIS — D70.0 CHRONIC FAMILIAL NEUTROPENIA (HCC): ICD-10-CM

## 2025-01-10 DIAGNOSIS — R79.0 LOW FERRITIN LEVEL: ICD-10-CM

## 2025-01-10 DIAGNOSIS — D70.0 CHRONIC FAMILIAL NEUTROPENIA (HCC): Primary | ICD-10-CM

## 2025-01-10 LAB
BASOPHILS # BLD AUTO: 0.02 THOUSANDS/ΜL (ref 0–0.1)
BASOPHILS NFR BLD AUTO: 1 % (ref 0–1)
EOSINOPHIL # BLD AUTO: 0.09 THOUSAND/ΜL (ref 0–0.61)
EOSINOPHIL NFR BLD AUTO: 3 % (ref 0–6)
ERYTHROCYTE [DISTWIDTH] IN BLOOD BY AUTOMATED COUNT: 12.8 % (ref 11.6–15.1)
FERRITIN SERPL-MCNC: 7 NG/ML (ref 11–307)
HCT VFR BLD AUTO: 41.1 % (ref 34.8–46.1)
HGB BLD-MCNC: 13.3 G/DL (ref 11.5–15.4)
IGA SERPL-MCNC: 143 MG/DL (ref 66–433)
IGG SERPL-MCNC: 921 MG/DL (ref 635–1741)
IGM SERPL-MCNC: 93 MG/DL (ref 45–281)
IMM GRANULOCYTES # BLD AUTO: 0.01 THOUSAND/UL (ref 0–0.2)
IMM GRANULOCYTES NFR BLD AUTO: 0 % (ref 0–2)
IRON SATN MFR SERPL: 13 % (ref 15–50)
IRON SERPL-MCNC: 56 UG/DL (ref 50–212)
LYMPHOCYTES # BLD AUTO: 1.8 THOUSANDS/ΜL (ref 0.6–4.47)
LYMPHOCYTES NFR BLD AUTO: 49 % (ref 14–44)
MCH RBC QN AUTO: 28.7 PG (ref 26.8–34.3)
MCHC RBC AUTO-ENTMCNC: 32.4 G/DL (ref 31.4–37.4)
MCV RBC AUTO: 89 FL (ref 82–98)
MONOCYTES # BLD AUTO: 0.31 THOUSAND/ΜL (ref 0.17–1.22)
MONOCYTES NFR BLD AUTO: 9 % (ref 4–12)
NEUTROPHILS # BLD AUTO: 1.35 THOUSANDS/ΜL (ref 1.85–7.62)
NEUTS SEG NFR BLD AUTO: 38 % (ref 43–75)
NRBC BLD AUTO-RTO: 0 /100 WBCS
PLATELET # BLD AUTO: 249 THOUSANDS/UL (ref 149–390)
PMV BLD AUTO: 10.6 FL (ref 8.9–12.7)
RBC # BLD AUTO: 4.63 MILLION/UL (ref 3.81–5.12)
TIBC SERPL-MCNC: 417.2 UG/DL (ref 250–450)
TRANSFERRIN SERPL-MCNC: 298 MG/DL (ref 203–362)
UIBC SERPL-MCNC: 361 UG/DL (ref 155–355)
WBC # BLD AUTO: 3.58 THOUSAND/UL (ref 4.31–10.16)

## 2025-01-10 PROCEDURE — 85025 COMPLETE CBC W/AUTO DIFF WBC: CPT

## 2025-01-10 PROCEDURE — 83550 IRON BINDING TEST: CPT

## 2025-01-10 PROCEDURE — 82784 ASSAY IGA/IGD/IGG/IGM EACH: CPT

## 2025-01-10 PROCEDURE — 36415 COLL VENOUS BLD VENIPUNCTURE: CPT

## 2025-01-10 PROCEDURE — 82728 ASSAY OF FERRITIN: CPT

## 2025-01-10 PROCEDURE — 83540 ASSAY OF IRON: CPT

## 2025-01-10 PROCEDURE — 99214 OFFICE O/P EST MOD 30 MIN: CPT | Performed by: INTERNAL MEDICINE

## 2025-01-10 NOTE — ASSESSMENT & PLAN NOTE
Ferritin was 8 on 3/21/23 with no anemia, and MCV 89   Reported occasionally have heavy menstrual periods , suspected iron deficiency with no anemia   Will check iron panel and revaluate , if deficient will replete   Orders:    Iron Panel (Includes Ferritin, Iron Sat%, Iron, and TIBC); Standing

## 2025-01-10 NOTE — PROGRESS NOTES
Name: Mechelle Parker      : 2005      MRN: [id number]  Encounter Provider: Nasim Salazar MD  Encounter Date: 1/10/2025   Encounter department[facility]  :  Assessment & Plan  Chronic familial neutropenia (HCC)  Has maternal family hx of leukopenia/neutropenia without complications    workup including leukemia / lymphoma flow cytometry, HIV, Hepatitis, Copper, B12/MMA , inflammatory marker are overall unremarkable.     Most likely familial , continue on observation. If worsening down the road may consider bone marrow transplant.     denies recurrent infections    will check immunoglobulins level & CBC w diff before next visit     Chronic leukopenia  See above   Orders:    IgG, IgA, IgM; Future    CBC and differential; Standing    Low ferritin level  Ferritin was 8 on 3/21/23 with no anemia, and MCV 89   Reported occasionally have heavy menstrual periods , suspected iron deficiency with no anemia   Will check iron panel and revaluate , if deficient will replete   Orders:    Iron Panel (Includes Ferritin, Iron Sat%, Iron, and TIBC); Standing        History of Present Illness   No chief complaint on file.    Mechelle Parker is 20 yo female presenting as FLYNN to Dr. Salazar regarding neutropenia and low ferritin level.     Patient reported family history, maternal side, with low WBC without complications or malignancy.     2021 2 Yamile 10, 2024 WBC range between 2.27-3.38 with absolute neutropenia ANC ranging 440-1470, hemoglobin and PLT are WNL.   .  2023 folate 11.4, iron 87, ferritin 8     2023 workup included:   - Leukemia/Lymphoma flow cytometry: Negative    - Copper Level 95 ug/dL [ref 80 - 158] WNL   - Chronic Hepatitis Panel: non reactive  - HIV 1/2 AG: Nonreactive  - Methylmalonic acid, serum: 96, not elevated  - Sedimentation rate 7, not elevated,   - CRP mildly elevated 4.1 and repeat in 2024 down to 1.4    01/10/25 presenting to follow up on  above. ROS + for skin rash x 2 weeks oval shaped hyperpigmented lesions on upper trunk, never had rash like that before and she was evaluated by PCP. We discussed to consider dermatology referral and trial topical steroid. Above A&P discussed regarding her low WBC and ferritin. Patient agrees.     Pertinent Medical History   01/09/25:   Chronic Neutropenia   Low Ferritin Level          Review of Systems  - GENERAL: Negative for any nausea, vomiting, fevers, chills, or weight loss.  - HEENT: Negative for any head/Neck trauma, pain, double/blurry vision, sinusitis, rhinitis, nose bleeding.  - CARDIAC: Negative for any chest pain, palpitation, Dyspnea on exertion, peripheral edema.  - PULMONARY: Negative for any SOB, cough, wheezing.   - GASTROINTESTINAL: Negative for any abdominal pain, N/V/D/C, blood in stool.   - GENITOURINARY: Negative for any dysuria, hematuria, incontinence.  - NEUROLOGIC: Negative for any muscle weakness, numbness/tingling, memory changes.    - MUSCULOSKELETAL: Negative for any joint pains/swelling, limited ROM.   - INTEGUMENTARY: Negative for any rashes, cuts/ lesions.  - HEMATOLOGIC: new skin rash x2 weeks ; Negative for any abnormal frequent infections or bleeding.        Objective   There were no vitals taken for this visit.    Physical Exam  - GEN: Appears well, alert and oriented x 3, pleasant and cooperative, in no acute distress  - HEENT: Anicteric, mucous membranes moist, PERRL and EOMI   - NECK: No lymphadenopathy, JVD or carotid bruits   - HEART: RRR, normal S1 and S2, no murmurs, clicks, gallops or rubs   - LUNGS: Clear to auscultation bilaterally; no wheezes, rales, or rhonchi  - ABDOMEN: Normal bowel sounds, soft, no tenderness, no distention, no organomegaly or masses felt on exam.   - EXTREMITIES: Peripheral pulses normal; no clubbing, cyanosis, or edema  - NEURO: No focal findings, CN II-XII are grossly intact.   - Musculoskeletal: 5/5 strength, normal ROM, no swollen or  erythematous joints.   - SKIN: hyperpigmented skin lesions oval shaped each about 2-3 cm longest diameter present on anterior and posterior upper trunk , no open wound or swelling.     Labs: I have reviewed the following labs:  Results for orders placed or performed in visit on 06/10/24   CBC and differential   Result Value Ref Range    WBC 2.78 (L) 4.31 - 10.16 Thousand/uL    RBC 4.71 3.81 - 5.12 Million/uL    Hemoglobin 13.6 11.5 - 15.4 g/dL    Hematocrit 42.1 34.8 - 46.1 %    MCV 89 82 - 98 fL    MCH 28.9 26.8 - 34.3 pg    MCHC 32.3 31.4 - 37.4 g/dL    RDW 12.6 11.6 - 15.1 %    MPV 10.9 8.9 - 12.7 fL    Platelets 275 149 - 390 Thousands/uL    nRBC 0 /100 WBCs    Segmented % 38 (L) 43 - 75 %    Immature Grans % 0 0 - 2 %    Lymphocytes % 52 (H) 14 - 44 %    Monocytes % 6 4 - 12 %    Eosinophils Relative 3 0 - 6 %    Basophils Relative 1 0 - 1 %    Absolute Neutrophils 1.05 (L) 1.85 - 7.62 Thousands/µL    Absolute Immature Grans 0.01 0.00 - 0.20 Thousand/uL    Absolute Lymphocytes 1.44 0.60 - 4.47 Thousands/µL    Absolute Monocytes 0.17 0.17 - 1.22 Thousand/µL    Eosinophils Absolute 0.07 0.00 - 0.61 Thousand/µL    Basophils Absolute 0.04 0.00 - 0.10 Thousands/µL   Basic metabolic panel   Result Value Ref Range    Sodium 138 135 - 147 mmol/L    Potassium 4.2 3.5 - 5.3 mmol/L    Chloride 105 96 - 108 mmol/L    CO2 26 21 - 32 mmol/L    ANION GAP 7 4 - 13 mmol/L    BUN 10 5 - 25 mg/dL    Creatinine 0.79 0.60 - 1.30 mg/dL    Glucose 90 65 - 140 mg/dL    Calcium 9.4 8.4 - 10.2 mg/dL    eGFR 108 ml/min/1.73sq m   Lipid panel   Result Value Ref Range    Cholesterol 147 See Comment mg/dL    Triglycerides 53 See Comment mg/dL    HDL, Direct 72 >=50 mg/dL    LDL Calculated 64 0 - 100 mg/dL    Non-HDL-Chol (CHOL-HDL) 75 mg/dl   TSH, 3rd generation with Free T4 reflex   Result Value Ref Range    TSH 3RD GENERATON 1.094 0.450 - 4.500 uIU/mL   DANETTE Screen w/ Reflex to Titer/Pattern   Result Value Ref Range    DANETTE Negative  Negative   Result Value Ref Range    CRP 1.4 <3.0 mg/L   RF Screen w/ Reflex to Titer   Result Value Ref Range    Rheumatoid Factor Negative Negative   Vitamin D 25 hydroxy   Result Value Ref Range    Vit D, 25-Hydroxy 28.0 (L) 30.0 - 100.0 ng/mL   Cyclic citrul peptide antibody, IgG   Result Value Ref Range    Cyclic Citrullinated Peptide Ab  1.2 See comment   Result Value Ref Range    Uric Acid 4.2 2.0 - 7.5 mg/dL   HLA-B27 antigen   Result Value Ref Range    HLA B27     Lyme Total AB W Reflex to IGM/IGG   Result Value Ref Range    Lyme Total Antibodies Negative Negative       Administrative Statements   I have spent a total time of 45 minutes in caring for this patient on the day of the visit/encounter including Diagnostic results, Prognosis, Risks and benefits of tx options, Instructions for management, Patient and family education, Importance of tx compliance, Risk factor reductions, Impressions, Counseling / Coordination of care, Documenting in the medical record, Reviewing / ordering tests, medicine, procedures  , and Obtaining or reviewing history  .     Urban Grey DO   Hematology and Medical Oncology - PGY V  [facility]

## 2025-01-10 NOTE — ASSESSMENT & PLAN NOTE
Has maternal family hx of leukopenia/neutropenia without complications    workup including leukemia / lymphoma flow cytometry, HIV, Hepatitis, Copper, B12/MMA , inflammatory marker are overall unremarkable.     Most likely familial , continue on observation. If worsening down the road may consider bone marrow transplant.     denies recurrent infections    will check immunoglobulins level & CBC w diff before next visit

## 2025-01-13 ENCOUNTER — TELEPHONE (OUTPATIENT)
Age: 20
End: 2025-01-13

## 2025-01-13 NOTE — TELEPHONE ENCOUNTER
Received call from Patient's Mother for Follow Up for Skin Check rash spreading body, hands, back - itchy, dry flaky. Offered first avail 3/2025, Patient's Mother rejected as too far out. Put on Recall list.     Patient's Mother verbalized understanding.

## 2025-01-14 ENCOUNTER — TELEPHONE (OUTPATIENT)
Dept: HEMATOLOGY ONCOLOGY | Facility: CLINIC | Age: 20
End: 2025-01-14

## 2025-01-14 DIAGNOSIS — R79.0 LOW FERRITIN LEVEL: Primary | ICD-10-CM

## 2025-01-14 DIAGNOSIS — D50.9 IRON DEFICIENCY ANEMIA, UNSPECIFIED IRON DEFICIENCY ANEMIA TYPE: ICD-10-CM

## 2025-01-14 RX ORDER — SODIUM CHLORIDE 9 MG/ML
20 INJECTION, SOLUTION INTRAVENOUS ONCE
OUTPATIENT
Start: 2025-01-28

## 2025-01-14 NOTE — TELEPHONE ENCOUNTER
Phoned pt and left voice message that she can benefit form five weekly doses of venofer at [facility].

## 2025-01-17 DIAGNOSIS — E55.9 VITAMIN D DEFICIENCY: ICD-10-CM

## 2025-01-17 RX ORDER — ERGOCALCIFEROL 1.25 MG/1
50000 CAPSULE, LIQUID FILLED ORAL WEEKLY
Qty: 12 CAPSULE | Refills: 0 | OUTPATIENT
Start: 2025-01-17

## 2025-01-21 DIAGNOSIS — L70.0 ACNE VULGARIS: ICD-10-CM

## 2025-01-28 ENCOUNTER — HOSPITAL ENCOUNTER (OUTPATIENT)
Dept: INFUSION CENTER | Facility: CLINIC | Age: 20
Discharge: HOME | End: 2025-01-28
Payer: COMMERCIAL

## 2025-01-28 VITALS
RESPIRATION RATE: 18 BRPM | DIASTOLIC BLOOD PRESSURE: 70 MMHG | TEMPERATURE: 97.9 F | HEART RATE: 74 BPM | SYSTOLIC BLOOD PRESSURE: 105 MMHG

## 2025-01-28 DIAGNOSIS — D50.9 IRON DEFICIENCY ANEMIA, UNSPECIFIED IRON DEFICIENCY ANEMIA TYPE: ICD-10-CM

## 2025-01-28 DIAGNOSIS — R79.0 LOW FERRITIN LEVEL: Primary | ICD-10-CM

## 2025-01-28 PROCEDURE — 96365 THER/PROPH/DIAG IV INF INIT: CPT

## 2025-01-28 RX ORDER — SODIUM CHLORIDE 9 MG/ML
20 INJECTION, SOLUTION INTRAVENOUS ONCE
Status: COMPLETED | OUTPATIENT
Start: 2025-01-28 | End: 2025-01-28

## 2025-01-28 RX ORDER — SODIUM CHLORIDE 9 MG/ML
20 INJECTION, SOLUTION INTRAVENOUS ONCE
Status: CANCELLED | OUTPATIENT
Start: 2025-02-04

## 2025-01-28 RX ADMIN — IRON SUCROSE 200 MG: 20 INJECTION, SOLUTION INTRAVENOUS at 14:17

## 2025-01-28 RX ADMIN — SODIUM CHLORIDE 20 ML/HR: 9 INJECTION, SOLUTION INTRAVENOUS at 14:16

## 2025-01-28 NOTE — PROGRESS NOTES
Pt presents for venofer. No new meds or concerns. Pt tolerated treatment without adverse reaction. Future appointments discussed, confirmed with patient for 2/4/25 1400. AVS declined.

## 2025-01-29 RX ORDER — CLINDAMYCIN PHOSPHATE 10 MG/G
GEL TOPICAL
Qty: 30 G | Refills: 0 | Status: SHIPPED | OUTPATIENT
Start: 2025-01-29

## 2025-02-04 ENCOUNTER — HOSPITAL ENCOUNTER (OUTPATIENT)
Dept: INFUSION CENTER | Facility: CLINIC | Age: 20
Discharge: HOME | End: 2025-02-04
Payer: COMMERCIAL

## 2025-02-04 VITALS
TEMPERATURE: 98.1 F | HEART RATE: 86 BPM | DIASTOLIC BLOOD PRESSURE: 79 MMHG | SYSTOLIC BLOOD PRESSURE: 122 MMHG | RESPIRATION RATE: 18 BRPM

## 2025-02-04 DIAGNOSIS — D50.9 IRON DEFICIENCY ANEMIA, UNSPECIFIED IRON DEFICIENCY ANEMIA TYPE: ICD-10-CM

## 2025-02-04 DIAGNOSIS — R79.0 LOW FERRITIN LEVEL: Primary | ICD-10-CM

## 2025-02-04 PROCEDURE — 96365 THER/PROPH/DIAG IV INF INIT: CPT

## 2025-02-04 RX ORDER — SODIUM CHLORIDE 9 MG/ML
20 INJECTION, SOLUTION INTRAVENOUS ONCE
Status: COMPLETED | OUTPATIENT
Start: 2025-02-04 | End: 2025-02-04

## 2025-02-04 RX ORDER — SODIUM CHLORIDE 9 MG/ML
20 INJECTION, SOLUTION INTRAVENOUS ONCE
Status: CANCELLED | OUTPATIENT
Start: 2025-02-11

## 2025-02-04 RX ADMIN — SODIUM CHLORIDE 20 ML/HR: 0.9 INJECTION, SOLUTION INTRAVENOUS at 14:14

## 2025-02-04 RX ADMIN — IRON SUCROSE 200 MG: 20 INJECTION, SOLUTION INTRAVENOUS at 14:17

## 2025-02-04 NOTE — PROGRESS NOTES
Mechelle Parker  tolerated Venofer without incident.     Mechelle Parker is aware of future appt on 2/11 at 1pm.     AVS printed and given to Mechelle Parker:    No (Declined by Mechelle Parker)

## 2025-02-05 NOTE — ADDENDUM NOTE
Encounter addended by: Юлия Veras, Pharmacist on: 2/5/2025 4:08 PM   Actions taken: i-Vent created or edited

## 2025-02-11 ENCOUNTER — HOSPITAL ENCOUNTER (OUTPATIENT)
Dept: INFUSION CENTER | Facility: CLINIC | Age: 20
Discharge: HOME | End: 2025-02-11
Payer: COMMERCIAL

## 2025-02-11 VITALS
TEMPERATURE: 97 F | RESPIRATION RATE: 18 BRPM | SYSTOLIC BLOOD PRESSURE: 116 MMHG | HEART RATE: 80 BPM | DIASTOLIC BLOOD PRESSURE: 77 MMHG

## 2025-02-11 DIAGNOSIS — R79.0 LOW FERRITIN LEVEL: Primary | ICD-10-CM

## 2025-02-11 DIAGNOSIS — D50.9 IRON DEFICIENCY ANEMIA, UNSPECIFIED IRON DEFICIENCY ANEMIA TYPE: ICD-10-CM

## 2025-02-11 PROCEDURE — 96365 THER/PROPH/DIAG IV INF INIT: CPT

## 2025-02-11 RX ORDER — SODIUM CHLORIDE 9 MG/ML
20 INJECTION, SOLUTION INTRAVENOUS ONCE
Status: CANCELLED | OUTPATIENT
Start: 2025-02-18

## 2025-02-11 RX ORDER — SODIUM CHLORIDE 9 MG/ML
20 INJECTION, SOLUTION INTRAVENOUS ONCE
Status: COMPLETED | OUTPATIENT
Start: 2025-02-11 | End: 2025-02-11

## 2025-02-11 RX ADMIN — IRON SUCROSE 200 MG: 20 INJECTION, SOLUTION INTRAVENOUS at 13:21

## 2025-02-11 RX ADMIN — SODIUM CHLORIDE 20 ML/HR: 9 INJECTION, SOLUTION INTRAVENOUS at 13:21

## 2025-02-11 NOTE — PROGRESS NOTES
Mechelle Parker  tolerated Venofer well with no complications.      Mechelle Parker is aware of future appt on Tuesday Feb 18, 2025 12:30 PM.     AVS declined by Mechelle Parker.

## 2025-02-11 NOTE — PLAN OF CARE
Problem: Potential for Falls  Goal: Patient will remain free of falls  Description: INTERVENTIONS:  - Educate patient/family on patient safety including physical limitations  - Instruct patient to call for assistance with activity   - Keep Call bell within reach  - Keep bed low and locked with side rails adjusted as appropriate  - Keep care items and personal belongings within reach  - Initiate and maintain comfort rounds  - Consider moving patient to room near nurses station  Outcome: Completed

## 2025-02-18 ENCOUNTER — HOSPITAL ENCOUNTER (OUTPATIENT)
Dept: INFUSION CENTER | Facility: CLINIC | Age: 20
Discharge: HOME | End: 2025-02-18
Payer: COMMERCIAL

## 2025-02-18 VITALS
DIASTOLIC BLOOD PRESSURE: 84 MMHG | RESPIRATION RATE: 16 BRPM | HEART RATE: 76 BPM | TEMPERATURE: 98.6 F | SYSTOLIC BLOOD PRESSURE: 127 MMHG

## 2025-02-18 DIAGNOSIS — R79.0 LOW FERRITIN LEVEL: Primary | ICD-10-CM

## 2025-02-18 DIAGNOSIS — D50.9 IRON DEFICIENCY ANEMIA, UNSPECIFIED IRON DEFICIENCY ANEMIA TYPE: ICD-10-CM

## 2025-02-18 PROCEDURE — 96365 THER/PROPH/DIAG IV INF INIT: CPT

## 2025-02-18 RX ORDER — SODIUM CHLORIDE 9 MG/ML
20 INJECTION, SOLUTION INTRAVENOUS ONCE
Status: COMPLETED | OUTPATIENT
Start: 2025-02-18 | End: 2025-02-18

## 2025-02-18 RX ORDER — SODIUM CHLORIDE 9 MG/ML
20 INJECTION, SOLUTION INTRAVENOUS ONCE
Status: CANCELLED | OUTPATIENT
Start: 2025-02-25

## 2025-02-18 RX ADMIN — IRON SUCROSE 200 MG: 20 INJECTION, SOLUTION INTRAVENOUS at 12:57

## 2025-02-18 RX ADMIN — SODIUM CHLORIDE 20 ML/HR: 0.9 INJECTION, SOLUTION INTRAVENOUS at 12:57

## 2025-02-18 NOTE — PROGRESS NOTES
Patient presents for Venofer infusion and is without complaints at this time. Patient tolerated infusion without incident. Declines AVS. Aware of next appointment on 2/25 @ 130pm..

## 2025-02-25 ENCOUNTER — HOSPITAL ENCOUNTER (OUTPATIENT)
Dept: INFUSION CENTER | Facility: CLINIC | Age: 20
Discharge: HOME | End: 2025-02-25
Payer: COMMERCIAL

## 2025-02-25 VITALS
SYSTOLIC BLOOD PRESSURE: 120 MMHG | RESPIRATION RATE: 18 BRPM | TEMPERATURE: 98.1 F | HEART RATE: 76 BPM | DIASTOLIC BLOOD PRESSURE: 84 MMHG

## 2025-02-25 DIAGNOSIS — R79.0 LOW FERRITIN LEVEL: Primary | ICD-10-CM

## 2025-02-25 DIAGNOSIS — D50.9 IRON DEFICIENCY ANEMIA, UNSPECIFIED IRON DEFICIENCY ANEMIA TYPE: ICD-10-CM

## 2025-02-25 PROCEDURE — 96365 THER/PROPH/DIAG IV INF INIT: CPT

## 2025-02-25 RX ORDER — SODIUM CHLORIDE 9 MG/ML
20 INJECTION, SOLUTION INTRAVENOUS ONCE
Status: COMPLETED | OUTPATIENT
Start: 2025-02-25 | End: 2025-02-25

## 2025-02-25 RX ORDER — SODIUM CHLORIDE 9 MG/ML
20 INJECTION, SOLUTION INTRAVENOUS ONCE
Status: CANCELLED | OUTPATIENT
Start: 2025-02-25

## 2025-02-25 RX ADMIN — SODIUM CHLORIDE 20 ML/HR: 0.9 INJECTION, SOLUTION INTRAVENOUS at 13:53

## 2025-02-25 RX ADMIN — IRON SUCROSE 200 MG: 20 INJECTION, SOLUTION INTRAVENOUS at 13:54

## 2025-02-25 NOTE — PROGRESS NOTES
"Mechelle Crawfordrosannavilma  tolerated treatment well with no complications. After infusion was complete and she was walking out to her car and felt a \"lump\" in her throat, and returned for evaluation. Pt reports it as swallowing sensation, not a tightening of of her airway. She denies any other symptoms. Pt was observed for 30 minutes, vss, and pt educated on calling/reporting to the closest ED immediately should symptoms worsen to which she verbalized understanding.    Mechelle Parker is aware she has no upcoming appointment.     AVS declined by Mechelle Parker.  "

## 2025-03-02 ENCOUNTER — OFFICE VISIT (OUTPATIENT)
Dept: URGENT CARE | Facility: MEDICAL CENTER | Age: 20
End: 2025-03-02
Payer: COMMERCIAL

## 2025-03-02 VITALS
OXYGEN SATURATION: 97 % | TEMPERATURE: 98.2 F | HEART RATE: 90 BPM | RESPIRATION RATE: 18 BRPM | SYSTOLIC BLOOD PRESSURE: 127 MMHG | DIASTOLIC BLOOD PRESSURE: 72 MMHG

## 2025-03-02 DIAGNOSIS — H60.8X2 NONINFECTIOUS OTITIS EXTERNA OF LEFT EAR, UNSPECIFIED CHRONICITY, UNSPECIFIED TYPE: Primary | ICD-10-CM

## 2025-03-02 PROCEDURE — 99213 OFFICE O/P EST LOW 20 MIN: CPT | Performed by: PHYSICIAN ASSISTANT

## 2025-03-02 RX ORDER — CEPHALEXIN 500 MG/1
500 CAPSULE ORAL EVERY 8 HOURS SCHEDULED
Qty: 30 CAPSULE | Refills: 0 | Status: SHIPPED | OUTPATIENT
Start: 2025-03-02 | End: 2025-03-12

## 2025-03-02 NOTE — PROGRESS NOTES
[facility]        NAME: Mechelle Parker is a 19 y.o. female  : 2005    MRN: [id number]  DATE: 2025  TIME: 4:17 PM    Assessment and Plan   Noninfectious otitis externa of left ear, unspecified chronicity, unspecified type [H60.8X2]  1. Noninfectious otitis externa of left ear, unspecified chronicity, unspecified type  cephalexin (KEFLEX) 500 mg capsule            Patient Instructions       Follow up with PCP in 3-5 days as needed.  Finish antibiotic.  Warm soaks.  No earbuds till this is resolved.    If tests have been performed at [facility], our office will contact you with results if changes need to be made to the care plan discussed with you at the visit.  You can review your full results on [facility].    Chief Complaint     Chief Complaint   Patient presents with    Ear Problem     Patient c/o a lump on her left inner ear x 3-4 days          History of Present Illness       Patient wears ear buds.  Over the last several days she has noticed redness and soreness and swelling at the entrance of her left ear.  No drainage.  She has trouble wearing her earbuds.  Pain is mild.  No fever or chills.  No red streaking.        Review of Systems   Review of Systems   All other systems reviewed and are negative.        Current Medications       Current Outpatient Medications:     cephalexin (KEFLEX) 500 mg capsule, Take 1 capsule (500 mg total) by mouth every 8 (eight) hours for 10 days, Disp: 30 capsule, Rfl: 0    Cholecalciferol (Vitamin D3) 50 MCG (2000 UT) TABS, TAKE ONE TABLET BY MOUTH EVERY DAY, Disp: 90 tablet, Rfl: 1    clindamycin 1 % gel, Apply topically after washing face. Use in the morning once daily., Disp: 30 g, Rfl: 0    loratadine (CLARITIN) 10 mg tablet, Take 1 tablet (10 mg total) by mouth daily, Disp: 30 tablet, Rfl: 0    tretinoin (RETIN-A) 0.1 % cream, Apply topically daily at bedtime Spread one pea-sized amount of medication over entire face about one hour before  bedtime every other night and then increase to nightly as tolerates, Disp: 45 g, Rfl: 2    Current Allergies     Allergies as of 03/02/2025    (No Known Allergies)            The following portions of the patient's history were reviewed and updated as appropriate: allergies, current medications, past family history, past medical history, past social history, past surgical history and problem list.     Past Medical History:   Diagnosis Date    Acute bacterial conjunctivitis of right eye 12/04/2020    Dysuria 08/11/2020    Folliculitis 06/19/2019    Pain and swelling of toe of right foot 02/08/2021    Syncope 06/14/2022    Viral upper respiratory tract infection 06/19/2019    Vision blurred 06/14/2022    Well adolescent visit with abnormal findings 06/19/2019       No past surgical history on file.    No family history on file.      Medications have been verified.        Objective   /72   Pulse 90   Temp 98.2 °F (36.8 °C)   Resp 18   LMP 02/25/2025 (Exact Date)   SpO2 97%   Patient's last menstrual period was 02/25/2025 (exact date).       Physical Exam     Physical Exam  Vitals and nursing note reviewed.   Constitutional:       Appearance: Normal appearance. She is normal weight.   HENT:      Left Ear: Tympanic membrane and external ear normal.      Nose: Nose normal.      Mouth/Throat:      Mouth: Mucous membranes are moist.   Eyes:      Conjunctiva/sclera: Conjunctivae normal.   Cardiovascular:      Rate and Rhythm: Normal rate and regular rhythm.      Pulses: Normal pulses.      Heart sounds: Normal heart sounds.   Pulmonary:      Effort: Pulmonary effort is normal.      Breath sounds: Normal breath sounds.   Neurological:      Mental Status: She is alert.   Psychiatric:         Mood and Affect: Mood normal.         Behavior: Behavior normal.       Ear canal swollen and erythematous slightly fluctuant at the opening.

## 2025-04-11 ENCOUNTER — OFFICE VISIT (OUTPATIENT)
Dept: FAMILY MEDICINE CLINIC | Facility: CLINIC | Age: 20
End: 2025-04-11
Payer: COMMERCIAL

## 2025-04-11 VITALS
OXYGEN SATURATION: 100 % | HEIGHT: 69 IN | TEMPERATURE: 96.2 F | WEIGHT: 165 LBS | SYSTOLIC BLOOD PRESSURE: 100 MMHG | HEART RATE: 85 BPM | DIASTOLIC BLOOD PRESSURE: 70 MMHG | BODY MASS INDEX: 24.44 KG/M2

## 2025-04-11 DIAGNOSIS — H92.01 RIGHT EAR PAIN: Primary | ICD-10-CM

## 2025-04-11 DIAGNOSIS — L70.0 ACNE VULGARIS: ICD-10-CM

## 2025-04-11 DIAGNOSIS — E55.9 VITAMIN D DEFICIENCY: ICD-10-CM

## 2025-04-11 PROCEDURE — 99214 OFFICE O/P EST MOD 30 MIN: CPT | Performed by: FAMILY MEDICINE

## 2025-04-11 NOTE — PROGRESS NOTES
"Name: Mechelle Parker      : 2005      MRN: [id number]  Encounter Provider: Leilani Chauhan MD  Encounter Date: 2025   Encounter department: [facility]  :  Assessment & Plan  Right ear pain  New diagnosis on physical exam tympanic membrane normal no laceration no bleeding reassured the patient       Vitamin D deficiency  Chronic continue vitamin D supplement due for vitamin D level before next appointment vitamin D rich diet review       Acne vulgaris  Chronic fair control continue current management follow-up with dermatology periodically tolerate medication well              History of Present Illness   Patient here for follow-up she was recently on an airplane from [place] and she felt like some blood coming out of her right ear and she is concerned about it no decreased hearing did not hear pop up no headache no nasal congestion no a possible infection symptom and no fever it happened during the airplane and no recurrent      Review of Systems   Constitutional:  Negative for chills and fever.   HENT:  Positive for ear pain. Negative for congestion, ear discharge, hearing loss and sore throat.    Eyes:  Negative for pain and visual disturbance.   Respiratory:  Negative for cough and shortness of breath.    Cardiovascular:  Negative for chest pain and palpitations.   Gastrointestinal:  Negative for abdominal pain and vomiting.   Genitourinary:  Negative for dysuria and hematuria.   Musculoskeletal:  Negative for arthralgias and back pain.   Skin:  Negative for color change and rash.   Neurological:  Negative for seizures and syncope.   All other systems reviewed and are negative.      Objective   /70 (BP Location: Left arm, Patient Position: Sitting)   Pulse 85   Temp (!) 96.2 °F (35.7 °C) (Tympanic)   Ht 5' 9\" (1.753 m)   Wt 74.8 kg (165 lb)   LMP 2025 (Exact Date)   SpO2 100%   Breastfeeding No   BMI 24.37 kg/m²      Physical Exam  Vitals and nursing note " reviewed.   Constitutional:       General: She is not in acute distress.     Appearance: She is well-developed. She is not diaphoretic.   HENT:      Head: Normocephalic.      Right Ear: Tympanic membrane, ear canal and external ear normal. There is no impacted cerumen.      Left Ear: Tympanic membrane, ear canal and external ear normal. There is no impacted cerumen.      Nose: No rhinorrhea.      Mouth/Throat:      Pharynx: No posterior oropharyngeal erythema.   Eyes:      General:         Right eye: No discharge.         Left eye: No discharge.      Conjunctiva/sclera: Conjunctivae normal.   Neck:      Vascular: No JVD.   Cardiovascular:      Rate and Rhythm: Normal rate and regular rhythm.      Heart sounds: Normal heart sounds. No murmur heard.     No gallop.   Pulmonary:      Effort: Pulmonary effort is normal. No respiratory distress.      Breath sounds: Normal breath sounds. No wheezing.   Abdominal:      General: There is no distension.      Palpations: Abdomen is soft.      Tenderness: There is no abdominal tenderness. There is no rebound.   Musculoskeletal:         General: No tenderness.      Cervical back: Normal range of motion and neck supple.   Lymphadenopathy:      Cervical: No cervical adenopathy.   Skin:     General: Skin is warm.      Findings: No rash.   Neurological:      Mental Status: She is alert and oriented to person, place, and time.

## 2025-04-11 NOTE — ASSESSMENT & PLAN NOTE
Chronic continue vitamin D supplement due for vitamin D level before next appointment vitamin D rich diet review

## 2025-04-11 NOTE — ASSESSMENT & PLAN NOTE
New diagnosis on physical exam tympanic membrane normal no laceration no bleeding reassured the patient

## 2025-05-19 DIAGNOSIS — L70.0 ACNE VULGARIS: ICD-10-CM

## 2025-05-20 ENCOUNTER — OFFICE VISIT (OUTPATIENT)
Dept: FAMILY MEDICINE CLINIC | Facility: CLINIC | Age: 20
End: 2025-05-20
Payer: COMMERCIAL

## 2025-05-20 VITALS
HEIGHT: 69 IN | TEMPERATURE: 97.9 F | WEIGHT: 167 LBS | BODY MASS INDEX: 24.73 KG/M2 | SYSTOLIC BLOOD PRESSURE: 110 MMHG | DIASTOLIC BLOOD PRESSURE: 70 MMHG | OXYGEN SATURATION: 99 % | HEART RATE: 85 BPM

## 2025-05-20 DIAGNOSIS — Z00.01 ENCOUNTER FOR ROUTINE ADULT MEDICAL EXAM WITH ABNORMAL FINDINGS: Primary | ICD-10-CM

## 2025-05-20 DIAGNOSIS — E66.3 OVERWEIGHT WITH BODY MASS INDEX (BMI) OF 25 TO 25.9 IN ADULT: ICD-10-CM

## 2025-05-20 PROCEDURE — 99395 PREV VISIT EST AGE 18-39: CPT | Performed by: FAMILY MEDICINE

## 2025-05-20 RX ORDER — TRETINOIN 1 MG/G
CREAM TOPICAL
Qty: 45 G | Refills: 0 | Status: SHIPPED | OUTPATIENT
Start: 2025-05-20

## 2025-05-20 NOTE — ASSESSMENT & PLAN NOTE
Advice and education were given regarding nutrition, aerobic exercises, weight-bearing exercises, cardiovascular risk reduction, fall risk reduction, and age-appropriate supplements.     The patient was counseled regarding instructions for management, risk factor reductions, prognosis, risks and benefits of treatment options, patient and family education, and importance of compliance with treatment.  Discussed with patient chlamydia and cervical cancer screening she declined for today she is not sexually active

## 2025-05-20 NOTE — PROGRESS NOTES
Adult Annual Physical  Name: Mechelle Parker      : 2005      MRN: [id number]  Encounter Provider: Leilani Chauhan MD  Encounter Date: 2025   Encounter department: [facility] PR[facility]    :  Assessment & Plan  Encounter for routine adult medical exam with abnormal findings  Advice and education were given regarding nutrition, aerobic exercises, weight-bearing exercises, cardiovascular risk reduction, fall risk reduction, and age-appropriate supplements.     The patient was counseled regarding instructions for management, risk factor reductions, prognosis, risks and benefits of treatment options, patient and family education, and importance of compliance with treatment.  Discussed with patient chlamydia and cervical cancer screening she declined for today she is not sexually active       Overweight with body mass index (BMI) of 25 to 25.9 in adult  BMI Counseling: Body mass index is 25.02 kg/m². The BMI is above normal. Nutrition recommendations include reducing portion sizes, 3-5 servings of fruits/vegetables daily, and consuming healthier snacks. Exercise recommendations include moderate aerobic physical activity for 150 minutes/week.            Preventive Screenings:  - Diabetes Screening: screening up-to-date  - Chlamydia Screening: patient declines   - Hepatitis C screening: screening up-to-date   - HIV screening: screening up-to-date   - Cervical cancer screening: patient declines   - Colon cancer screening: screening not indicated   - Lung cancer screening: screening not indicated     Immunizations:  - Immunizations due: HPV (Gardasil 9)      Depression Screening and Follow-up Plan: Patient was screened for depression during today's encounter. They screened negative with a PHQ-2 score of 2.          History of Present Illness patient here for well exam no new concern past medical surgical family and social history reviewed      Adult Annual Physical:  Patient presents for annual  "physical.     Diet and Physical Activity:  - Diet/Nutrition: no special diet.  - Exercise: moderate cardiovascular exercise, vigorous cardiovascular exercise, strength training exercises and 5-7 times a week on average.    Depression Screening:  - PHQ-2 Score: 2    General Health:  - Sleep: sleeps poorly, 4-6 hours of sleep on average and unrefreshing sleep.  - Hearing: normal hearing bilateral ears.  - Vision: most recent eye exam < 1 year ago and wears glasses.  - Dental: regular dental visits, brushes teeth twice daily and floss regularly.    /GYN Health:  - Follows with GYN: no.   - Menopause: premenopausal.   - Last menstrual cycle: 4/22/2025.   - History of STDs: no    Advanced Care Planning:  - Has an advanced directive?: no    - Has a durable medical POA?: no    - ACP document given to patient?: no      Review of Systems   Constitutional:  Negative for chills and fever.   HENT:  Negative for ear pain and sore throat.    Eyes:  Negative for pain and visual disturbance.   Respiratory:  Negative for cough and shortness of breath.    Cardiovascular:  Negative for chest pain and palpitations.   Gastrointestinal:  Negative for abdominal pain, constipation, diarrhea and vomiting.   Genitourinary:  Negative for dysuria and hematuria.   Musculoskeletal:  Negative for arthralgias and back pain.   Skin:  Negative for color change and rash.   Neurological:  Negative for seizures and syncope.   All other systems reviewed and are negative.        Objective   /70 (BP Location: Left arm, Patient Position: Sitting)   Pulse 85   Temp 97.9 °F (36.6 °C) (Tympanic)   Ht 5' 8.5\" (1.74 m)   Wt 75.8 kg (167 lb)   LMP 04/22/2025 (Exact Date)   SpO2 99%   Breastfeeding No   BMI 25.02 kg/m²     Physical Exam  Vitals and nursing note reviewed.   Constitutional:       General: She is not in acute distress.     Appearance: She is well-developed. She is not diaphoretic.   HENT:      Head: Normocephalic.      Right Ear: " Tympanic membrane and external ear normal.      Left Ear: Tympanic membrane and external ear normal.      Nose: No rhinorrhea.      Mouth/Throat:      Pharynx: No posterior oropharyngeal erythema.     Eyes:      General:         Right eye: No discharge.         Left eye: No discharge.      Conjunctiva/sclera: Conjunctivae normal.     Neck:      Vascular: No JVD.     Cardiovascular:      Rate and Rhythm: Normal rate and regular rhythm.      Heart sounds: Normal heart sounds. No murmur heard.     No gallop.   Pulmonary:      Effort: Pulmonary effort is normal. No respiratory distress.      Breath sounds: Normal breath sounds. No wheezing.   Abdominal:      General: There is no distension.      Palpations: Abdomen is soft.      Tenderness: There is no abdominal tenderness. There is no rebound.     Musculoskeletal:         General: No tenderness.      Cervical back: Normal range of motion and neck supple.   Lymphadenopathy:      Cervical: No cervical adenopathy.     Skin:     General: Skin is warm.      Findings: No rash.     Neurological:      Mental Status: She is alert and oriented to person, place, and time.

## 2025-05-20 NOTE — ASSESSMENT & PLAN NOTE
BMI Counseling: Body mass index is 25.02 kg/m². The BMI is above normal. Nutrition recommendations include reducing portion sizes, 3-5 servings of fruits/vegetables daily, and consuming healthier snacks. Exercise recommendations include moderate aerobic physical activity for 150 minutes/week.

## 2025-05-20 NOTE — PATIENT INSTRUCTIONS
"Patient Education     Routine physical for adults   The Basics   Written by the doctors and editors at [facility]   What is a physical? -- A physical is a routine visit, or \"check-up,\" with your doctor. You might also hear it called a \"wellness visit\" or \"preventive visit.\"  During each visit, the doctor will:   Ask about your physical and mental health   Ask about your habits, behaviors, and lifestyle   Do an exam   Give you vaccines if needed   Talk to you about any medicines you take   Give advice about your health   Answer your questions  Getting regular check-ups is an important part of taking care of your health. It can help your doctor find and treat any problems you have. But it's also important for preventing health problems.  A routine physical is different from a \"sick visit.\" A sick visit is when you see a doctor because of a health concern or problem. Since physicals are scheduled ahead of time, you can think about what you want to ask the doctor.  How often should I get a physical? -- It depends on your age and health. In general, for people age 21 years and older:   If you are younger than 50 years, you might be able to get a physical every 3 years.   If you are 50 years or older, your doctor might recommend a physical every year.  If you have an ongoing health condition, like diabetes or high blood pressure, your doctor will probably want to see you more often.  What happens during a physical? -- In general, each visit will include:   Physical exam - The doctor or nurse will check your height, weight, heart rate, and blood pressure. They will also look at your eyes and ears. They will ask about how you are feeling and whether you have any symptoms that bother you.   Medicines - It's a good idea to bring a list of all the medicines you take to each doctor visit. Your doctor will talk to you about your medicines and answer any questions. Tell them if you are having any side effects that bother you. You " "should also tell them if you are having trouble paying for any of your medicines.   Habits and behaviors - This includes:   Your diet   Your exercise habits   Whether you smoke, drink alcohol, or use drugs   Whether you are sexually active   Whether you feel safe at home  Your doctor will talk to you about things you can do to improve your health and lower your risk of health problems. They will also offer help and support. For example, if you want to quit smoking, they can give you advice and might prescribe medicines. If you want to improve your diet or get more physical activity, they can help you with this, too.   Lab tests, if needed - The tests you get will depend on your age and situation. For example, your doctor might want to check your:   Cholesterol   Blood sugar   Iron level   Vaccines - The recommended vaccines will depend on your age, health, and what vaccines you already had. Vaccines are very important because they can prevent certain serious or deadly infections.   Discussion of screening - \"Screening\" means checking for diseases or other health problems before they cause symptoms. Your doctor can recommend screening based on your age, risk, and preferences. This might include tests to check for:   Cancer, such as breast, prostate, cervical, ovarian, colorectal, prostate, lung, or skin cancer   Sexually transmitted infections, such as chlamydia and gonorrhea   Mental health conditions like depression and anxiety  Your doctor will talk to you about the different types of screening tests. They can help you decide which screenings to have. They can also explain what the results might mean.   Answering questions - The physical is a good time to ask the doctor or nurse questions about your health. If needed, they can refer you to other doctors or specialists, too.  Adults older than 65 years often need other care, too. As you get older, your doctor will talk to you about:   How to prevent falling at " home   Hearing or vision tests   Memory testing   How to take your medicines safely   Making sure that you have the help and support you need at home  All topics are updated as new evidence becomes available and our peer review process is complete.  This topic retrieved from [employer] on: May 02, 2024.  Topic 387864 Version 1.0  Release: 32.4.3 - C32.122  © 2024 UpToDate, Inc. and/or its affiliates. All rights reserved.  Consumer Information Use and Disclaimer   Disclaimer: This generalized information is a limited summary of diagnosis, treatment, and/or medication information. It is not meant to be comprehensive and should be used as a tool to help the user understand and/or assess potential diagnostic and treatment options. It does NOT include all information about conditions, treatments, medications, side effects, or risks that may apply to a specific patient. It is not intended to be medical advice or a substitute for the medical advice, diagnosis, or treatment of a health care provider based on the health care provider's examination and assessment of a patient's specific and unique circumstances. Patients must speak with a health care provider for complete information about their health, medical questions, and treatment options, including any risks or benefits regarding use of medications. This information does not endorse any treatments or medications as safe, effective, or approved for treating a specific patient. UpToDate, Inc. and its affiliates disclaim any warranty or liability relating to this information or the use thereof.The use of this information is governed by the Terms of Use, available at https://www.[employer] Consultantsuwer.com/en/know/clinical-effectiveness-terms. 2024© UpToDate, Inc. and its affiliates and/or licensors. All rights reserved.  Copyright   © 2024 UpToDate, Inc. and/or its affiliates. All rights reserved.

## 2025-06-10 ENCOUNTER — APPOINTMENT (OUTPATIENT)
Dept: LAB | Facility: MEDICAL CENTER | Age: 20
End: 2025-06-10
Attending: OPHTHALMOLOGY
Payer: COMMERCIAL

## 2025-06-10 DIAGNOSIS — G70.00 MYASTHENIA GRAVIS WITHOUT EXACERBATION (HCC): ICD-10-CM

## 2025-06-10 DIAGNOSIS — E05.00 BASEDOW'S DISEASE: ICD-10-CM

## 2025-06-10 LAB — TSH SERPL DL<=0.05 MIU/L-ACNC: 1.25 UIU/ML (ref 0.45–4.5)

## 2025-06-10 PROCEDURE — 84443 ASSAY THYROID STIM HORMONE: CPT

## 2025-06-10 PROCEDURE — 86041 ACETYLCHOLN RCPTR BNDNG ANTB: CPT

## 2025-06-10 PROCEDURE — 86043 ACETYLCHOLN RCPTR MODLG ANTB: CPT

## 2025-06-10 PROCEDURE — 86042 ACETYLCHOLN RCPTR BLCKG ANTB: CPT

## 2025-06-10 PROCEDURE — 36415 COLL VENOUS BLD VENIPUNCTURE: CPT

## 2025-06-13 LAB — ACHR BIND AB SER-SCNC: <0.07 NMOL/L (ref 0–0.24)

## 2025-06-16 LAB
ACHR BLOCK AB/ACHR TOTAL SFR SER: 14 % (ref 0–25)
ACHR MOD AB/ACHR TOTAL SFR SER: 9 % (ref 0–45)

## 2025-06-18 ENCOUNTER — TELEPHONE (OUTPATIENT)
Dept: HEMATOLOGY ONCOLOGY | Facility: CLINIC | Age: 20
End: 2025-06-18

## 2025-06-18 NOTE — TELEPHONE ENCOUNTER
Left message for patient stating that her appointment on 7/11 with Dr. Salazar had to be r/s and her new appointment will be 9/26/25 at 10:20.

## 2025-07-06 ENCOUNTER — OFFICE VISIT (OUTPATIENT)
Dept: URGENT CARE | Facility: MEDICAL CENTER | Age: 20
End: 2025-07-06
Payer: COMMERCIAL

## 2025-07-06 VITALS
TEMPERATURE: 97.8 F | RESPIRATION RATE: 18 BRPM | OXYGEN SATURATION: 98 % | SYSTOLIC BLOOD PRESSURE: 132 MMHG | DIASTOLIC BLOOD PRESSURE: 66 MMHG | HEART RATE: 77 BPM

## 2025-07-06 DIAGNOSIS — L03.031 CELLULITIS OF TOE OF RIGHT FOOT: Primary | ICD-10-CM

## 2025-07-06 PROCEDURE — 99213 OFFICE O/P EST LOW 20 MIN: CPT

## 2025-07-06 RX ORDER — CEPHALEXIN 500 MG/1
500 CAPSULE ORAL EVERY 8 HOURS SCHEDULED
Qty: 21 CAPSULE | Refills: 0 | Status: SHIPPED | OUTPATIENT
Start: 2025-07-06 | End: 2025-07-13

## 2025-07-06 NOTE — PROGRESS NOTES
[facility]  Name: Mechelle Parker      : 2005      MRN: [id number]  Encounter Provider: Rupinder Alvarez PA-C  Encounter Date: 2025   Encounter department: [facility]  :  Assessment & Plan  Cellulitis of toe of right foot    Orders:    cephalexin (KEFLEX) 500 mg capsule; Take 1 capsule (500 mg total) by mouth every 8 (eight) hours for 7 days  Presentation consistent with cellulitis of the great toe secondary to wound.  Prescribed course of Keflex.  Area appeared macerated.  Advised wound care and symptomatic treatments.      Patient Instructions  Prescribed course of Keflex, take as directed.  Wash area thoroughly daily with soap and water.  Keep area dry.  Tylenol and/or ibuprofen as needed for pain.    Follow up with PCP in 3-5 days.  Proceed to  ER if symptoms worsen.    If tests are performed, our office will contact you with results only if changes need to made to the care plan discussed with you at the visit. [facility]    Chief Complaint:   Chief Complaint   Patient presents with    Toe Pain     Patient c/o right great toe pain with redness and swelling x 2-3 days      History of Present Illness   Patient presents with mother for evaluation of a wound between her right 1st and 2nd toes.  States about 2 to 3 days ago she was [place] in the [place] when she stepped on something that caused a wound in between the 1st and 2nd great toes.  She did not clean the area when this occurred, just went back into the ocean.  She notes that the area has become painful, red, swollen.  The area is tender to touch.  She has been cleaning the area daily with soap and water, other than that she has not been doing anything specific for treatment.  Denies any history of diabetes or skin infections.          Review of Systems   Constitutional:  Negative for chills and fever.   Musculoskeletal:  Positive for joint swelling. Negative for  myalgias.   Skin:  Positive for color change and wound.     Past Medical History   Past Medical History[1]  Past Surgical History[2]  Family History[3]  she reports that she has never smoked. She has never been exposed to tobacco smoke. She has never used smokeless tobacco. She reports that she does not drink alcohol and does not use drugs.  Current Outpatient Medications   Medication Instructions    cephalexin (KEFLEX) 500 mg, Oral, Every 8 hours scheduled    Cholecalciferol (Vitamin D3) 50 MCG (2000 UT) TABS TAKE ONE TABLET BY MOUTH EVERY DAY    clindamycin 1 % gel Apply topically after washing face. Use in the morning once daily.    tretinoin (RETIN-A) 0.1 % cream Topical, Daily at bedtime, Spread one pea-sized amount of medication over entire face about one hour before bedtime every other night and then increase to nightly as tolerates   Allergies[4]     Objective   /66   Pulse 77   Temp 97.8 °F (36.6 °C)   Resp 18   SpO2 98%      Physical Exam  Vitals and nursing note reviewed.   Constitutional:       General: She is not in acute distress.     Appearance: Normal appearance. She is well-developed. She is not ill-appearing.   HENT:      Head: Normocephalic and atraumatic.     Eyes:      Conjunctiva/sclera: Conjunctivae normal.      Pupils: Pupils are equal, round, and reactive to light.       Cardiovascular:      Rate and Rhythm: Normal rate and regular rhythm.      Pulses: Normal pulses.      Heart sounds: Normal heart sounds. No murmur heard.  Pulmonary:      Effort: Pulmonary effort is normal. No respiratory distress.      Breath sounds: Normal breath sounds. No stridor. No wheezing, rhonchi or rales.   Feet:      Comments: Laceration between 1st and 2nd toes.  Right great toe with erythema, warmth, swelling, tenderness.  1st and 2nd toes NVI distally.    Skin:     General: Skin is warm and dry.      Capillary Refill: Capillary refill takes less than 2 seconds.      Findings: Wound (Between 1st and  "2nd toes, surrounding area is macerated) present.     Neurological:      Mental Status: She is alert.     Psychiatric:         Mood and Affect: Mood normal.         Portions of the record may have been created with voice recognition software.  Occasional wrong word or \"sound a like\" substitutions may have occurred due to the inherent limitations of voice recognition software.  Read the chart carefully and recognize, using context, where substitutions have occurred.         [1]   Past Medical History:  Diagnosis Date    Acute bacterial conjunctivitis of right eye 12/04/2020    Dysuria 08/11/2020    Folliculitis 06/19/2019    Pain and swelling of toe of right foot 02/08/2021    Syncope 06/14/2022    Viral upper respiratory tract infection 06/19/2019    Vision blurred 06/14/2022    Well adolescent visit with abnormal findings 06/19/2019   [2] No past surgical history on file.  [3] No family history on file.  [4] No Known Allergies    "

## 2025-07-06 NOTE — PATIENT INSTRUCTIONS
"Prescribed course of Keflex, take as directed.  Wash area thoroughly daily with soap and water.  Keep area dry.  Tylenol and/or ibuprofen as needed for pain.    Follow up with PCP in 3-5 days.  Proceed to  ER if symptoms worsen.    If tests are performed, our office will contact you with results only if changes need to made to the care plan discussed with you at the visit. You can review your full results on [facility] MyChart.    Patient Education     Taking care of cuts, scrapes, and puncture wounds   The Basics   Written by the doctors and editors at [facility]   Does my cut need stitches? -- If your cut does not go all the way through the skin, it does not need stitches (figure 1). If your cut is wide, jagged, or does go all the way through the skin, you will most likely need stitches.  If you are not sure if your cut needs stitches, check with your doctor or nurse. Sometimes they will use special staples instead of stitches. Doctors can also use a special type of skin glue to close certain types of cuts.  This article is about caring for minor wounds (like small cuts and scrapes) that do not need to be closed with stitches, staples, or skin glue. If you got stitches, staples, or glue, your doctor or nurse will tell you how to care for yourself.  What if I have a puncture wound? -- A \"puncture wound\" is a type of cut that is made when a sharp object, like a nail, goes through the skin and into the tissue underneath. This type of wound can also be caused by animal or human bites. Puncture wounds are more likely than other minor wounds to get infected.  If you were bitten by an animal or human, see your doctor or nurse. Bite wounds need special care.  How do I take care of a minor wound on my own? -- To take care of your wound, follow these basic first aid guidelines:   Clean the wound - Wash it well with soap and water. If there is dirt, glass, or another object in your cut that you can't get out after you wash it, " call your doctor or nurse.   Stop the bleeding - If your wound is bleeding, press a clean cloth or bandage firmly on the area for 20 minutes. You can also help slow the bleeding by holding the wound above the level of your heart, if possible. If the bleeding doesn't stop after 20 minutes, call your doctor or nurse.   Put a thin layer of antibiotic ointment on the wound   Cover the wound with a bandage or gauze. Keep the bandage clean and dry. Change the bandage 1 to 2 times every day until your wound heals.   Do not swim or soak your wound in water until it has healed. Ask your doctor or nurse if you have any questions.   Each time you change the bandage, look at your skin to check for signs of infection. These include redness that is getting worse or spreading, swelling, or warmth in the area. You might see some thin clear or yellow fluid as the wound heals, which is normal.  Most minor wounds heal on their own within 7 to 10 days. As your wound heals, a scab will form. Do not pick at the scab or scratch the skin around it.  When should I call the doctor or nurse? -- Call the doctor or nurse if you have any signs of an infection. Signs of an infection include:   Fever   Redness, swelling, warmth, or increased pain around the wound   A bad smell coming from the wound   Pus (thick yellow, green, or gray fluid) draining from the wound   Red streaks on the skin around the wound, or red streaks going up your arm or leg  Will I need a tetanus shot? -- Maybe. It depends on how old you are and when your last tetanus shot was. Tetanus is a serious infection that can cause muscle stiffness and spasms, and even lead to death. It is caused by bacteria (germs) that live in the dirt.  Most children get a tetanus vaccine as part of their routine check-ups. Vaccines can prevent certain serious or deadly infections. Many adults also get a tetanus vaccine as part of their routine check-ups. Getting all your vaccines is important,  "since it's possible to get tetanus even from a small wound.  If your skin is cut or punctured, and especially if the cut is dirty or deep, ask your doctor or nurse if you need a tetanus shot.  All topics are updated as new evidence becomes available and our peer review process is complete.  This topic retrieved from [employer] on: Mar 20, 2024.  Topic 88579 Version 11.0  Release: 32.2.4 - C32.78  © 2024 UpToDate, Inc. and/or its affiliates. All rights reserved.  figure 1: Minor cuts and scrapes     Picture A shows a scrape (also called an \"abrasion\"). The scrape doesn't go all the way through the skin, so it does not need stitches. Picture B shows a cut that does go all the way through the skin. This cut is deep, so it needs stitches.  Graphic 52048 Version 4.0  Consumer Information Use and Disclaimer   Disclaimer: This generalized information is a limited summary of diagnosis, treatment, and/or medication information. It is not meant to be comprehensive and should be used as a tool to help the user understand and/or assess potential diagnostic and treatment options. It does NOT include all information about conditions, treatments, medications, side effects, or risks that may apply to a specific patient. It is not intended to be medical advice or a substitute for the medical advice, diagnosis, or treatment of a health care provider based on the health care provider's examination and assessment of a patient's specific and unique circumstances. Patients must speak with a health care provider for complete information about their health, medical questions, and treatment options, including any risks or benefits regarding use of medications. This information does not endorse any treatments or medications as safe, effective, or approved for treating a specific patient. UpToDate, Inc. and its affiliates disclaim any warranty or liability relating to this information or the use thereof.The use of this information is governed " by the Terms of Use, available at https://www.CityH[employer]es[employer]wer.com/en/know/clinical-effectiveness-terms. 2024© UpToDate, Inc. and its affiliates and/or licensors. All rights reserved.  Copyright   © 2024 UpToDate, Inc. and/or its affiliates. All rights reserved.      Patient Education     Cellulitis (skin infection) in adults - Discharge instructions   The Basics   Written by the doctors and editors at [facility]   What are discharge instructions? -- Discharge instructions are information about how to take care of yourself after getting medical care for a health problem.  What is cellulitis? -- Cellulitis is a skin infection (figure 1). It can happen when germs get into the skin. Normally, different types of germs live on a person's skin. Most of the time, these germs do not cause any problems. But if a person gets a cut or a break in the skin, the germs can get into the skin and cause an infection.  You need antibiotics to treat cellulitis. Usually, this involves taking antibiotic pills. Just putting antibiotic ointment on the skin does not work. It is important to take all of your antibiotics even if you start to feel better.  How do I care for myself at home? -- Ask the doctor or nurse what you should do when you go home. Make sure that you understand exactly what you need to do to care for yourself. Ask questions if there is anything you do not understand.  You should also:   Prop your painful body part on pillows, keeping it above the level of your heart. This might help lessen pain and swelling.   Keep the infected area clean and dry. Do not squeeze, scratch, or rub it. You can gently wash the area with soap and water or take a shower. Pat the area dry with a clean towel.   Wash your hands before and after you touch the infected area.  When should I call the doctor? -- Call for advice if:   You have a fever of 101°F (38.4°C) or higher, or chills.   The area becomes more red, swollen, or painful, or the redness or  swelling spreads up your leg or arm or to a larger area.   The infected area is not better after 3 days of taking antibiotics.  All topics are updated as new evidence becomes available and our peer review process is complete.  This topic retrieved from [employer] on: Mar 06, 2024.  Topic 159147 Version 1.0  Release: 32.2.4 - C32.64  © 2024 UpToDate, Inc. and/or its affiliates. All rights reserved.  figure 1: Cellulitis     Cellulitis is an infection of the skin. These infections can cause redness, pain, and/or swelling.  Graphic 996900 Version 1.0  Consumer Information Use and Disclaimer   Disclaimer: This generalized information is a limited summary of diagnosis, treatment, and/or medication information. It is not meant to be comprehensive and should be used as a tool to help the user understand and/or assess potential diagnostic and treatment options. It does NOT include all information about conditions, treatments, medications, side effects, or risks that may apply to a specific patient. It is not intended to be medical advice or a substitute for the medical advice, diagnosis, or treatment of a health care provider based on the health care provider's examination and assessment of a patient's specific and unique circumstances. Patients must speak with a health care provider for complete information about their health, medical questions, and treatment options, including any risks or benefits regarding use of medications. This information does not endorse any treatments or medications as safe, effective, or approved for treating a specific patient. UpToDate, Inc. and its affiliates disclaim any warranty or liability relating to this information or the use thereof.The use of this information is governed by the Terms of Use, available at https://www.wolterskluwer.com/en/know/clinical-effectiveness-terms. 2024© UpToDate, Inc. and its affiliates and/or licensors. All rights reserved.  Copyright   © 2024 UpToDate, Inc.  and/or its affiliates. All rights reserved.

## 2025-07-23 ENCOUNTER — TELEPHONE (OUTPATIENT)
Dept: DERMATOLOGY | Facility: CLINIC | Age: 20
End: 2025-07-23

## 2025-07-23 NOTE — TELEPHONE ENCOUNTER
Received fax for Pharmacy claim for Retin-A 0.1% Cream rejected as it requires a PA, scanned into chart

## 2025-07-24 NOTE — TELEPHONE ENCOUNTER
PA for RETIN-A 0.1% CREAM SUBMITTED to [employer]     via    []CMM-KEY:   []Surescripts-Case ID #   []Availity-Auth ID # NDC #   [x]Faxed to plan   []Other website   []Phone call Case ID #     [x]PA sent as URGENT    All office notes, labs and other pertaining documents and studies sent. Clinical questions answered. Awaiting determination from insurance company.     Turnaround time for your insurance to make a decision on your Prior Authorization can take 7-21 business days.

## 2025-07-25 NOTE — TELEPHONE ENCOUNTER
PA for Retin-a 0.1% cream  APPROVED     Date(s) approved 07/24/2026    Case #    Patient advised by          [x][employer] Message  [x]Phone call   []LMOM  []L/M to call office as no active Communication consent on file  []Unable to leave detailed message as VM not approved on Communication consent       Pharmacy advised by    [x]Fax  []Phone call  []Secure Chat    Specialty Pharmacy    []     Approval letter scanned into Media Yes

## 2025-07-31 ENCOUNTER — APPOINTMENT (OUTPATIENT)
Dept: LAB | Facility: MEDICAL CENTER | Age: 20
End: 2025-07-31
Payer: COMMERCIAL

## 2025-07-31 DIAGNOSIS — D70.0 CHRONIC FAMILIAL NEUTROPENIA (HCC): ICD-10-CM

## 2025-07-31 DIAGNOSIS — Z11.1 TUBERCULOSIS SCREENING: ICD-10-CM

## 2025-07-31 LAB
ALBUMIN SERPL BCG-MCNC: 4.1 G/DL (ref 3.5–5)
ALP SERPL-CCNC: 46 U/L (ref 34–104)
ALT SERPL W P-5'-P-CCNC: 10 U/L (ref 7–52)
ANION GAP SERPL CALCULATED.3IONS-SCNC: 8 MMOL/L (ref 4–13)
AST SERPL W P-5'-P-CCNC: 13 U/L (ref 13–39)
BILIRUB SERPL-MCNC: 0.48 MG/DL (ref 0.2–1)
BUN SERPL-MCNC: 15 MG/DL (ref 5–25)
CALCIUM SERPL-MCNC: 9.4 MG/DL (ref 8.4–10.2)
CHLORIDE SERPL-SCNC: 102 MMOL/L (ref 96–108)
CO2 SERPL-SCNC: 27 MMOL/L (ref 21–32)
CREAT SERPL-MCNC: 0.77 MG/DL (ref 0.6–1.3)
GFR SERPL CREATININE-BSD FRML MDRD: 110 ML/MIN/1.73SQ M
GLUCOSE SERPL-MCNC: 91 MG/DL (ref 65–140)
POTASSIUM SERPL-SCNC: 3.7 MMOL/L (ref 3.5–5.3)
PROT SERPL-MCNC: 6.4 G/DL (ref 6.4–8.4)
SODIUM SERPL-SCNC: 137 MMOL/L (ref 135–147)

## 2025-07-31 PROCEDURE — 36415 COLL VENOUS BLD VENIPUNCTURE: CPT

## 2025-07-31 PROCEDURE — 80053 COMPREHEN METABOLIC PANEL: CPT

## 2025-07-31 PROCEDURE — 86480 TB TEST CELL IMMUN MEASURE: CPT

## 2025-08-01 LAB
GAMMA INTERFERON BACKGROUND BLD IA-ACNC: 0.01 IU/ML
M TB IFN-G BLD-IMP: NEGATIVE
M TB IFN-G CD4+ BCKGRND COR BLD-ACNC: 0.02 IU/ML
M TB IFN-G CD4+ BCKGRND COR BLD-ACNC: 0.04 IU/ML
MITOGEN IGNF BCKGRD COR BLD-ACNC: 9.99 IU/ML

## 2025-08-06 DIAGNOSIS — E55.9 VITAMIN D DEFICIENCY: ICD-10-CM

## 2025-08-08 RX ORDER — CHOLECALCIFEROL (VITAMIN D3) 50 MCG
2000 TABLET ORAL DAILY
Qty: 90 TABLET | Refills: 1 | Status: SHIPPED | OUTPATIENT
Start: 2025-08-08